# Patient Record
Sex: FEMALE | Race: ASIAN | Employment: FULL TIME | URBAN - METROPOLITAN AREA
[De-identification: names, ages, dates, MRNs, and addresses within clinical notes are randomized per-mention and may not be internally consistent; named-entity substitution may affect disease eponyms.]

---

## 2019-02-27 RX ORDER — LOSARTAN POTASSIUM 25 MG/1
25 TABLET ORAL EVERY MORNING
COMMUNITY
End: 2020-01-02

## 2019-02-28 ENCOUNTER — HOSPITAL ENCOUNTER (INPATIENT)
Facility: CLINIC | Age: 29
LOS: 1 days | Discharge: HOME | DRG: 132 | End: 2019-03-01
Attending: DENTIST | Admitting: DENTIST
Payer: COMMERCIAL

## 2019-02-28 ENCOUNTER — ANESTHESIA (OUTPATIENT)
Dept: SURGERY | Facility: CLINIC | Age: 29
DRG: 132 | End: 2019-02-28
Payer: COMMERCIAL

## 2019-02-28 ENCOUNTER — ANESTHESIA EVENT (OUTPATIENT)
Dept: SURGERY | Facility: CLINIC | Age: 29
DRG: 132 | End: 2019-02-28
Payer: COMMERCIAL

## 2019-02-28 PROBLEM — M26.02 MAXILLARY HYPOPLASIA: Status: ACTIVE | Noted: 2019-02-28

## 2019-02-28 LAB — HCG UR QL: NEGATIVE

## 2019-02-28 PROCEDURE — 25000125 ZZHC RX 250: Performed by: DENTIST

## 2019-02-28 PROCEDURE — C1713 ANCHOR/SCREW BN/BN,TIS/BN: HCPCS | Performed by: DENTIST

## 2019-02-28 PROCEDURE — 25000128 H RX IP 250 OP 636: Performed by: NURSE ANESTHETIST, CERTIFIED REGISTERED

## 2019-02-28 PROCEDURE — 25000128 H RX IP 250 OP 636: Performed by: DENTIST

## 2019-02-28 PROCEDURE — 36000093 ZZH SURGERY LEVEL 4 1ST 30 MIN: Performed by: DENTIST

## 2019-02-28 PROCEDURE — 25000125 ZZHC RX 250: Performed by: NURSE ANESTHETIST, CERTIFIED REGISTERED

## 2019-02-28 PROCEDURE — 40000170 ZZH STATISTIC PRE-PROCEDURE ASSESSMENT II: Performed by: DENTIST

## 2019-02-28 PROCEDURE — 36000063 ZZH SURGERY LEVEL 4 EA 15 ADDTL MIN: Performed by: DENTIST

## 2019-02-28 PROCEDURE — 25000566 ZZH SEVOFLURANE, EA 15 MIN: Performed by: DENTIST

## 2019-02-28 PROCEDURE — 0NST04Z REPOSITION RIGHT MANDIBLE WITH INTERNAL FIXATION DEVICE, OPEN APPROACH: ICD-10-PCS | Performed by: DENTIST

## 2019-02-28 PROCEDURE — 37000008 ZZH ANESTHESIA TECHNICAL FEE, 1ST 30 MIN: Performed by: DENTIST

## 2019-02-28 PROCEDURE — 25000128 H RX IP 250 OP 636: Performed by: ANESTHESIOLOGY

## 2019-02-28 PROCEDURE — 37000009 ZZH ANESTHESIA TECHNICAL FEE, EACH ADDTL 15 MIN: Performed by: DENTIST

## 2019-02-28 PROCEDURE — 81025 URINE PREGNANCY TEST: CPT | Performed by: ANESTHESIOLOGY

## 2019-02-28 PROCEDURE — 27211024 ZZHC OR SUPPLY OTHER OPNP: Performed by: DENTIST

## 2019-02-28 PROCEDURE — 12000000 ZZH R&B MED SURG/OB

## 2019-02-28 PROCEDURE — 25800030 ZZH RX IP 258 OP 636: Performed by: NURSE ANESTHETIST, CERTIFIED REGISTERED

## 2019-02-28 PROCEDURE — 0NSV04Z REPOSITION LEFT MANDIBLE WITH INTERNAL FIXATION DEVICE, OPEN APPROACH: ICD-10-PCS | Performed by: DENTIST

## 2019-02-28 PROCEDURE — 71000012 ZZH RECOVERY PHASE 1 LEVEL 1 FIRST HR: Performed by: DENTIST

## 2019-02-28 PROCEDURE — 0NSR04Z REPOSITION MAXILLA WITH INTERNAL FIXATION DEVICE, OPEN APPROACH: ICD-10-PCS | Performed by: DENTIST

## 2019-02-28 PROCEDURE — 27210794 ZZH OR GENERAL SUPPLY STERILE: Performed by: DENTIST

## 2019-02-28 PROCEDURE — 25000125 ZZHC RX 250: Performed by: ANESTHESIOLOGY

## 2019-02-28 PROCEDURE — 25000132 ZZH RX MED GY IP 250 OP 250 PS 637: Performed by: DENTIST

## 2019-02-28 PROCEDURE — 25800030 ZZH RX IP 258 OP 636: Performed by: DENTIST

## 2019-02-28 PROCEDURE — 0NUR0JZ SUPPLEMENT MAXILLA WITH SYNTHETIC SUBSTITUTE, OPEN APPROACH: ICD-10-PCS | Performed by: DENTIST

## 2019-02-28 DEVICE — WIRE SURGICAL STEEL 26GA 0 DS-26: Type: IMPLANTABLE DEVICE | Site: MAXILLA | Status: FUNCTIONAL

## 2019-02-28 DEVICE — IMP SCR STRK 2.0X04MM 5020404: Type: IMPLANTABLE DEVICE | Site: MANDIBLE | Status: FUNCTIONAL

## 2019-02-28 DEVICE — IMP PLATE STRK ORBITAL 08H 9255758: Type: IMPLANTABLE DEVICE | Site: MAXILLA | Status: FUNCTIONAL

## 2019-02-28 DEVICE — IMP PLATE LEIB SAG 4 HOLE 3MM 92-10565: Type: IMPLANTABLE DEVICE | Site: MANDIBLE | Status: FUNCTIONAL

## 2019-02-28 DEVICE — IMP SCR STRK CROSS 1.7X4MM SELF TAP 5017004: Type: IMPLANTABLE DEVICE | Site: MAXILLA | Status: FUNCTIONAL

## 2019-02-28 DEVICE — WIRE SURGICAL STEEL 24GA 2 DS-24: Type: IMPLANTABLE DEVICE | Site: MANDIBLE | Status: FUNCTIONAL

## 2019-02-28 DEVICE — IMP SCR STRK 2.0X12MM 5020412: Type: IMPLANTABLE DEVICE | Site: MANDIBLE | Status: FUNCTIONAL

## 2019-02-28 DEVICE — IMP SCR STRK 2.0X14MM 5020414: Type: IMPLANTABLE DEVICE | Site: MANDIBLE | Status: FUNCTIONAL

## 2019-02-28 RX ORDER — VECURONIUM BROMIDE 1 MG/ML
INJECTION, POWDER, LYOPHILIZED, FOR SOLUTION INTRAVENOUS PRN
Status: DISCONTINUED | OUTPATIENT
Start: 2019-02-28 | End: 2019-02-28

## 2019-02-28 RX ORDER — AMPICILLIN AND SULBACTAM 2; 1 G/1; G/1
3 INJECTION, POWDER, FOR SOLUTION INTRAMUSCULAR; INTRAVENOUS EVERY 8 HOURS
Status: DISCONTINUED | OUTPATIENT
Start: 2019-02-28 | End: 2019-03-01 | Stop reason: HOSPADM

## 2019-02-28 RX ORDER — VASOPRESSIN 20 U/ML
INJECTION PARENTERAL
Status: DISCONTINUED
Start: 2019-02-28 | End: 2019-02-28 | Stop reason: HOSPADM

## 2019-02-28 RX ORDER — SODIUM CHLORIDE, SODIUM LACTATE, POTASSIUM CHLORIDE, CALCIUM CHLORIDE 600; 310; 30; 20 MG/100ML; MG/100ML; MG/100ML; MG/100ML
INJECTION, SOLUTION INTRAVENOUS CONTINUOUS
Status: DISCONTINUED | OUTPATIENT
Start: 2019-02-28 | End: 2019-03-01 | Stop reason: HOSPADM

## 2019-02-28 RX ORDER — OXYMETAZOLINE HYDROCHLORIDE 0.05 G/100ML
2 SPRAY NASAL ONCE
Status: COMPLETED | OUTPATIENT
Start: 2019-02-28 | End: 2019-02-28

## 2019-02-28 RX ORDER — METHYLPREDNISOLONE SODIUM SUCCINATE 125 MG/2ML
INJECTION, POWDER, LYOPHILIZED, FOR SOLUTION INTRAMUSCULAR; INTRAVENOUS PRN
Status: DISCONTINUED | OUTPATIENT
Start: 2019-02-28 | End: 2019-02-28

## 2019-02-28 RX ORDER — DIPHENHYDRAMINE HCL 25 MG
25 CAPSULE ORAL EVERY 6 HOURS PRN
Status: DISCONTINUED | OUTPATIENT
Start: 2019-02-28 | End: 2019-03-01 | Stop reason: HOSPADM

## 2019-02-28 RX ORDER — ONDANSETRON 4 MG/1
4 TABLET, ORALLY DISINTEGRATING ORAL EVERY 30 MIN PRN
Status: DISCONTINUED | OUTPATIENT
Start: 2019-02-28 | End: 2019-02-28 | Stop reason: HOSPADM

## 2019-02-28 RX ORDER — DIPHENHYDRAMINE HYDROCHLORIDE 50 MG/ML
25 INJECTION INTRAMUSCULAR; INTRAVENOUS EVERY 6 HOURS PRN
Status: DISCONTINUED | OUTPATIENT
Start: 2019-02-28 | End: 2019-03-01 | Stop reason: HOSPADM

## 2019-02-28 RX ORDER — NEOSTIGMINE METHYLSULFATE 1 MG/ML
VIAL (ML) INJECTION PRN
Status: DISCONTINUED | OUTPATIENT
Start: 2019-02-28 | End: 2019-02-28

## 2019-02-28 RX ORDER — NALOXONE HYDROCHLORIDE 0.4 MG/ML
.1-.4 INJECTION, SOLUTION INTRAMUSCULAR; INTRAVENOUS; SUBCUTANEOUS
Status: DISCONTINUED | OUTPATIENT
Start: 2019-02-28 | End: 2019-03-01 | Stop reason: HOSPADM

## 2019-02-28 RX ORDER — CEFAZOLIN SODIUM 1 G/3ML
INJECTION, POWDER, FOR SOLUTION INTRAMUSCULAR; INTRAVENOUS PRN
Status: DISCONTINUED | OUTPATIENT
Start: 2019-02-28 | End: 2019-02-28

## 2019-02-28 RX ORDER — PSEUDOEPHEDRINE HCL 60 MG
60 TABLET ORAL EVERY 6 HOURS PRN
Status: DISCONTINUED | OUTPATIENT
Start: 2019-02-28 | End: 2019-03-01 | Stop reason: HOSPADM

## 2019-02-28 RX ORDER — LABETALOL HYDROCHLORIDE 5 MG/ML
10 INJECTION, SOLUTION INTRAVENOUS
Status: DISCONTINUED | OUTPATIENT
Start: 2019-02-28 | End: 2019-02-28 | Stop reason: HOSPADM

## 2019-02-28 RX ORDER — OXYMETAZOLINE HYDROCHLORIDE 0.05 G/100ML
2 SPRAY NASAL 2 TIMES DAILY PRN
Status: DISCONTINUED | OUTPATIENT
Start: 2019-02-28 | End: 2019-03-01 | Stop reason: HOSPADM

## 2019-02-28 RX ORDER — LIDOCAINE 40 MG/G
CREAM TOPICAL
Status: DISCONTINUED | OUTPATIENT
Start: 2019-02-28 | End: 2019-03-01 | Stop reason: HOSPADM

## 2019-02-28 RX ORDER — HYDROMORPHONE HYDROCHLORIDE 1 MG/ML
.3-.5 INJECTION, SOLUTION INTRAMUSCULAR; INTRAVENOUS; SUBCUTANEOUS EVERY 5 MIN PRN
Status: DISCONTINUED | OUTPATIENT
Start: 2019-02-28 | End: 2019-02-28 | Stop reason: HOSPADM

## 2019-02-28 RX ORDER — NALOXONE HYDROCHLORIDE 0.4 MG/ML
.1-.4 INJECTION, SOLUTION INTRAMUSCULAR; INTRAVENOUS; SUBCUTANEOUS
Status: DISCONTINUED | OUTPATIENT
Start: 2019-02-28 | End: 2019-02-28

## 2019-02-28 RX ORDER — HYDROCODONE BITARTRATE AND ACETAMINOPHEN 5; 325 MG/1; MG/1
1-2 TABLET ORAL EVERY 4 HOURS PRN
Status: DISCONTINUED | OUTPATIENT
Start: 2019-02-28 | End: 2019-03-01 | Stop reason: HOSPADM

## 2019-02-28 RX ORDER — ONDANSETRON 2 MG/ML
4 INJECTION INTRAMUSCULAR; INTRAVENOUS EVERY 6 HOURS PRN
Status: DISCONTINUED | OUTPATIENT
Start: 2019-02-28 | End: 2019-03-01 | Stop reason: HOSPADM

## 2019-02-28 RX ORDER — ONDANSETRON 4 MG/1
4 TABLET, ORALLY DISINTEGRATING ORAL EVERY 6 HOURS PRN
Status: DISCONTINUED | OUTPATIENT
Start: 2019-02-28 | End: 2019-03-01 | Stop reason: HOSPADM

## 2019-02-28 RX ORDER — METHYLPREDNISOLONE SODIUM SUCCINATE 125 MG/2ML
125 INJECTION, POWDER, LYOPHILIZED, FOR SOLUTION INTRAMUSCULAR; INTRAVENOUS EVERY 4 HOURS
Status: COMPLETED | OUTPATIENT
Start: 2019-02-28 | End: 2019-03-01

## 2019-02-28 RX ORDER — CHLORHEXIDINE GLUCONATE ORAL RINSE 1.2 MG/ML
SOLUTION DENTAL PRN
Status: DISCONTINUED | OUTPATIENT
Start: 2019-02-28 | End: 2019-02-28 | Stop reason: HOSPADM

## 2019-02-28 RX ORDER — CHLORHEXIDINE GLUCONATE ORAL RINSE 1.2 MG/ML
10 SOLUTION DENTAL ONCE
Status: COMPLETED | OUTPATIENT
Start: 2019-02-28 | End: 2019-02-28

## 2019-02-28 RX ORDER — MORPHINE SULFATE 2 MG/ML
2-4 INJECTION, SOLUTION INTRAMUSCULAR; INTRAVENOUS
Status: DISCONTINUED | OUTPATIENT
Start: 2019-02-28 | End: 2019-03-01 | Stop reason: HOSPADM

## 2019-02-28 RX ORDER — GLYCOPYRROLATE 0.2 MG/ML
INJECTION, SOLUTION INTRAMUSCULAR; INTRAVENOUS PRN
Status: DISCONTINUED | OUTPATIENT
Start: 2019-02-28 | End: 2019-02-28

## 2019-02-28 RX ORDER — LIDOCAINE HYDROCHLORIDE 20 MG/ML
INJECTION, SOLUTION INFILTRATION; PERINEURAL PRN
Status: DISCONTINUED | OUTPATIENT
Start: 2019-02-28 | End: 2019-02-28

## 2019-02-28 RX ORDER — CHLORHEXIDINE GLUCONATE ORAL RINSE 1.2 MG/ML
15 SOLUTION DENTAL 2 TIMES DAILY
Status: DISCONTINUED | OUTPATIENT
Start: 2019-02-28 | End: 2019-03-01 | Stop reason: HOSPADM

## 2019-02-28 RX ORDER — BENZOCAINE/MENTHOL 6 MG-10 MG
LOZENGE MUCOUS MEMBRANE PRN
Status: DISCONTINUED | OUTPATIENT
Start: 2019-02-28 | End: 2019-02-28 | Stop reason: HOSPADM

## 2019-02-28 RX ORDER — FENTANYL CITRATE 50 UG/ML
25-50 INJECTION, SOLUTION INTRAMUSCULAR; INTRAVENOUS EVERY 5 MIN PRN
Status: DISCONTINUED | OUTPATIENT
Start: 2019-02-28 | End: 2019-02-28 | Stop reason: HOSPADM

## 2019-02-28 RX ORDER — ONDANSETRON 2 MG/ML
4 INJECTION INTRAMUSCULAR; INTRAVENOUS EVERY 30 MIN PRN
Status: DISCONTINUED | OUTPATIENT
Start: 2019-02-28 | End: 2019-02-28 | Stop reason: HOSPADM

## 2019-02-28 RX ORDER — MAGNESIUM HYDROXIDE 1200 MG/15ML
LIQUID ORAL PRN
Status: DISCONTINUED | OUTPATIENT
Start: 2019-02-28 | End: 2019-02-28 | Stop reason: HOSPADM

## 2019-02-28 RX ORDER — CEFAZOLIN SODIUM 1 G/3ML
1 INJECTION, POWDER, FOR SOLUTION INTRAMUSCULAR; INTRAVENOUS SEE ADMIN INSTRUCTIONS
Status: DISCONTINUED | OUTPATIENT
Start: 2019-02-28 | End: 2019-02-28 | Stop reason: HOSPADM

## 2019-02-28 RX ORDER — LIDOCAINE 40 MG/G
CREAM TOPICAL
Status: DISCONTINUED | OUTPATIENT
Start: 2019-02-28 | End: 2019-02-28 | Stop reason: HOSPADM

## 2019-02-28 RX ORDER — SODIUM CHLORIDE, SODIUM LACTATE, POTASSIUM CHLORIDE, CALCIUM CHLORIDE 600; 310; 30; 20 MG/100ML; MG/100ML; MG/100ML; MG/100ML
INJECTION, SOLUTION INTRAVENOUS CONTINUOUS PRN
Status: DISCONTINUED | OUTPATIENT
Start: 2019-02-28 | End: 2019-02-28

## 2019-02-28 RX ORDER — FENTANYL CITRATE 50 UG/ML
INJECTION, SOLUTION INTRAMUSCULAR; INTRAVENOUS PRN
Status: DISCONTINUED | OUTPATIENT
Start: 2019-02-28 | End: 2019-02-28

## 2019-02-28 RX ORDER — METHYLPREDNISOLONE SODIUM SUCCINATE 125 MG/2ML
125 INJECTION, POWDER, LYOPHILIZED, FOR SOLUTION INTRAMUSCULAR; INTRAVENOUS EVERY 6 HOURS
Status: COMPLETED | OUTPATIENT
Start: 2019-03-01 | End: 2019-03-01

## 2019-02-28 RX ORDER — SODIUM CHLORIDE, SODIUM LACTATE, POTASSIUM CHLORIDE, CALCIUM CHLORIDE 600; 310; 30; 20 MG/100ML; MG/100ML; MG/100ML; MG/100ML
INJECTION, SOLUTION INTRAVENOUS CONTINUOUS
Status: DISCONTINUED | OUTPATIENT
Start: 2019-02-28 | End: 2019-02-28 | Stop reason: HOSPADM

## 2019-02-28 RX ORDER — CEFAZOLIN SODIUM 2 G/100ML
2 INJECTION, SOLUTION INTRAVENOUS
Status: COMPLETED | OUTPATIENT
Start: 2019-02-28 | End: 2019-02-28

## 2019-02-28 RX ORDER — PROPOFOL 10 MG/ML
INJECTION, EMULSION INTRAVENOUS PRN
Status: DISCONTINUED | OUTPATIENT
Start: 2019-02-28 | End: 2019-02-28

## 2019-02-28 RX ORDER — ONDANSETRON 2 MG/ML
INJECTION INTRAMUSCULAR; INTRAVENOUS PRN
Status: DISCONTINUED | OUTPATIENT
Start: 2019-02-28 | End: 2019-02-28

## 2019-02-28 RX ADMIN — LIDOCAINE HYDROCHLORIDE 100 MG: 20 INJECTION, SOLUTION INFILTRATION; PERINEURAL at 13:05

## 2019-02-28 RX ADMIN — FENTANYL CITRATE 100 MCG: 50 INJECTION, SOLUTION INTRAMUSCULAR; INTRAVENOUS at 13:39

## 2019-02-28 RX ADMIN — CHLORHEXIDINE GLUCONATE 15 ML: 1.2 RINSE ORAL at 20:34

## 2019-02-28 RX ADMIN — PHENYLEPHRINE HYDROCHLORIDE 100 MCG: 10 INJECTION, SOLUTION INTRAMUSCULAR; INTRAVENOUS; SUBCUTANEOUS at 15:05

## 2019-02-28 RX ADMIN — VECURONIUM BROMIDE 3 MG: 1 INJECTION, POWDER, LYOPHILIZED, FOR SOLUTION INTRAVENOUS at 14:07

## 2019-02-28 RX ADMIN — AMPICILLIN SODIUM AND SULBACTAM SODIUM 3 G: 2; 1 INJECTION, POWDER, FOR SOLUTION INTRAMUSCULAR; INTRAVENOUS at 21:19

## 2019-02-28 RX ADMIN — PROPOFOL 200 MG: 10 INJECTION, EMULSION INTRAVENOUS at 13:05

## 2019-02-28 RX ADMIN — METHYLPREDNISOLONE SODIUM SUCCINATE 125 MG: 125 INJECTION, POWDER, FOR SOLUTION INTRAMUSCULAR; INTRAVENOUS at 22:28

## 2019-02-28 RX ADMIN — SODIUM CHLORIDE, POTASSIUM CHLORIDE, SODIUM LACTATE AND CALCIUM CHLORIDE: 600; 310; 30; 20 INJECTION, SOLUTION INTRAVENOUS at 13:03

## 2019-02-28 RX ADMIN — PHENYLEPHRINE HYDROCHLORIDE 0.2 MCG/KG/MIN: 10 INJECTION, SOLUTION INTRAMUSCULAR; INTRAVENOUS; SUBCUTANEOUS at 15:25

## 2019-02-28 RX ADMIN — PHENYLEPHRINE HYDROCHLORIDE 100 MCG: 10 INJECTION, SOLUTION INTRAMUSCULAR; INTRAVENOUS; SUBCUTANEOUS at 15:28

## 2019-02-28 RX ADMIN — PROCHLORPERAZINE EDISYLATE 10 MG: 5 INJECTION INTRAMUSCULAR; INTRAVENOUS at 22:44

## 2019-02-28 RX ADMIN — NEOSTIGMINE METHYLSULFATE 4 MG: 1 INJECTION, SOLUTION INTRAVENOUS at 16:42

## 2019-02-28 RX ADMIN — GLYCOPYRROLATE 0.6 MG: 0.2 INJECTION, SOLUTION INTRAMUSCULAR; INTRAVENOUS at 16:42

## 2019-02-28 RX ADMIN — FENTANYL CITRATE 100 MCG: 50 INJECTION, SOLUTION INTRAMUSCULAR; INTRAVENOUS at 14:04

## 2019-02-28 RX ADMIN — ONDANSETRON 4 MG: 2 INJECTION INTRAMUSCULAR; INTRAVENOUS at 19:30

## 2019-02-28 RX ADMIN — SODIUM CHLORIDE, POTASSIUM CHLORIDE, SODIUM LACTATE AND CALCIUM CHLORIDE: 600; 310; 30; 20 INJECTION, SOLUTION INTRAVENOUS at 19:32

## 2019-02-28 RX ADMIN — SODIUM CHLORIDE, POTASSIUM CHLORIDE, SODIUM LACTATE AND CALCIUM CHLORIDE: 600; 310; 30; 20 INJECTION, SOLUTION INTRAVENOUS at 16:39

## 2019-02-28 RX ADMIN — PHENYLEPHRINE HYDROCHLORIDE 200 MCG: 10 INJECTION, SOLUTION INTRAMUSCULAR; INTRAVENOUS; SUBCUTANEOUS at 16:45

## 2019-02-28 RX ADMIN — VECURONIUM BROMIDE 2 MG: 1 INJECTION, POWDER, LYOPHILIZED, FOR SOLUTION INTRAVENOUS at 14:50

## 2019-02-28 RX ADMIN — CHLORHEXIDINE GLUCONATE 10 ML: 1.2 RINSE ORAL at 12:16

## 2019-02-28 RX ADMIN — METHYLPREDNISOLONE SODIUM SUCCINATE 125 MG: 125 INJECTION, POWDER, FOR SOLUTION INTRAMUSCULAR; INTRAVENOUS at 19:33

## 2019-02-28 RX ADMIN — PHENYLEPHRINE HYDROCHLORIDE 50 MCG: 10 INJECTION, SOLUTION INTRAMUSCULAR; INTRAVENOUS; SUBCUTANEOUS at 15:11

## 2019-02-28 RX ADMIN — CEFAZOLIN SODIUM 2 G: 2 INJECTION, SOLUTION INTRAVENOUS at 13:17

## 2019-02-28 RX ADMIN — OXYMETAZOLINE HYDROCHLORIDE 2 SPRAY: 5 SPRAY NASAL at 12:16

## 2019-02-28 RX ADMIN — LIDOCAINE HYDROCHLORIDE 0.1 ML: 10 INJECTION, SOLUTION EPIDURAL; INFILTRATION; INTRACAUDAL; PERINEURAL at 12:18

## 2019-02-28 RX ADMIN — MIDAZOLAM 2 MG: 1 INJECTION INTRAMUSCULAR; INTRAVENOUS at 13:03

## 2019-02-28 RX ADMIN — FENTANYL CITRATE 50 MCG: 50 INJECTION, SOLUTION INTRAMUSCULAR; INTRAVENOUS at 13:05

## 2019-02-28 RX ADMIN — ROCURONIUM BROMIDE 50 MG: 10 INJECTION INTRAVENOUS at 13:05

## 2019-02-28 RX ADMIN — VECURONIUM BROMIDE 2 MG: 1 INJECTION, POWDER, LYOPHILIZED, FOR SOLUTION INTRAVENOUS at 15:34

## 2019-02-28 RX ADMIN — PHENYLEPHRINE HYDROCHLORIDE 100 MCG: 10 INJECTION, SOLUTION INTRAMUSCULAR; INTRAVENOUS; SUBCUTANEOUS at 14:49

## 2019-02-28 RX ADMIN — METHYLPREDNISOLONE 125 MG: 125 INJECTION, POWDER, LYOPHILIZED, FOR SOLUTION INTRAMUSCULAR; INTRAVENOUS at 13:25

## 2019-02-28 RX ADMIN — PHENYLEPHRINE HYDROCHLORIDE 50 MCG: 10 INJECTION, SOLUTION INTRAMUSCULAR; INTRAVENOUS; SUBCUTANEOUS at 15:22

## 2019-02-28 RX ADMIN — DEXMEDETOMIDINE HYDROCHLORIDE 0.5 MCG/KG/HR: 100 INJECTION, SOLUTION INTRAVENOUS at 13:05

## 2019-02-28 RX ADMIN — CEFAZOLIN SODIUM 1 G: 2 INJECTION, SOLUTION INTRAVENOUS at 15:10

## 2019-02-28 RX ADMIN — Medication 0.3 MG: at 17:50

## 2019-02-28 RX ADMIN — ONDANSETRON 4 MG: 2 INJECTION INTRAMUSCULAR; INTRAVENOUS at 16:34

## 2019-02-28 SDOH — HEALTH STABILITY: MENTAL HEALTH: HOW OFTEN DO YOU HAVE A DRINK CONTAINING ALCOHOL?: NEVER

## 2019-02-28 ASSESSMENT — MIFFLIN-ST. JEOR: SCORE: 1526.95

## 2019-02-28 ASSESSMENT — ACTIVITIES OF DAILY LIVING (ADL): ADLS_ACUITY_SCORE: 14

## 2019-02-28 NOTE — H&P
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  Diagnoses include anterior open bite, maxillary hypoplasia, mandibular prognathism, mandibular asymmetry.  Our plan is for a maxillary Le Fort I procedure to close open bite and to advance the maxilla as much as the incisors were \"detorqued\" with open bite closure.  We will then do a rotational mandibular setback.  Both procedures with rigid fixation.  Preoperative history and physical reveals no contraindications to our planned procedure.  The surgery has been worked up by both Dr. Carr and Dr. Miki Lincoln.  We have used virtual surgical planning.  We have discussed the case with Dr. Jayden Singleton, orthodontist, regarding the maxillary and mandibular movements and what we recommend in terms of maxillary movement and mandibular movement.      All aspects of the Le Fort I procedure in the mandibular osteotomy have been reviewed in detail.  Splints have been constructed, tried in and found to fit satisfactorily.  Informed consent has been reviewed and signed.  The patient understands we will do our very best; however, no guarantees can be made regarding a good result nor freedom from a complication.  Complications include but are not limited to anesthesia, hospitalization, swelling, discomfort, bleeding, nasal airflow changes, nasal alar base changes, changes in chin point, relapse, numbness which can be permanent, endodontic therapy to maxillary or mandibular teeth, need for intermaxillary fixation, special diet, other details for recovery including rinses, antibiotics, analgesics and return visits at weeks 1, 3 and 6.  Ms. Sanchez is willing to accept these risks in proceeding with surgery.  She has consented for a maxillary LeFort I osteotomy for open bite closure and advancement combined with rotational mandibular setback.         MARIELOS CARR DDS, MD  "            D: 2019   T: 2019   MT: MANUEL      Name:     ODALYS RAY   MRN:      [id number]        Account:      [id number]   :      1990        Admitted:     2019                   Document: [id number]

## 2019-02-28 NOTE — BRIEF OP NOTE
[facility]    Brief Operative Note    Pre-operative diagnosis: ANTERIOR OPEN BITE, POSTERIOR MAXILLARY VERTICAL EXCESS, Maxillary A-P hypoplasia, Mandibular A-P hyperplasia  Post-operative diagnosis Same as pre-op  Procedure: Procedure(s):  COMBINED LE FORT ONE, OSTEOTOMY SAGITTAL SPLIT  Surgeon: Surgeon(s) and Role:     * Prashanth Dumont, MAHNAZ - Primary     * Len Lincoln DDS - Assisting  Anesthesia: General   Estimated blood loss: 200 ml  Drains: None  Specimens: * No specimens in log *  Findings:   None.  Complications: None.  Implants: None.

## 2019-02-28 NOTE — ANESTHESIA CARE TRANSFER NOTE
Patient: Bailee Sanchez    Procedure(s):  COMBINED LE FORT ONE, OSTEOTOMY SAGITTAL SPLIT    Diagnosis: ANTERIOR OPEN BITE, POSTERIOR MAXILLARY VERTICAL EXCESS  Diagnosis Additional Information: No value filed.    Anesthesia Type:   General, ETT     Note:  Airway :Face Mask  Patient transferred to:PACU  Comments: Neuromuscular blockade reversed after TOF 4/4, spontaneous respirations, adequate tidal volumes, followed commands to voice, oropharynx suctioned with soft flexible catheter, extubated atraumatically, extubated with suction, airway patent after extubation.  Oxygen via facemask at 8 liters per minute to PACU. Oxygen tubing connected to wall O2 in PACU, SpO2, NiBP, and EKG monitors and alarms on and functioning, Lynn Hugger warmer connected to patient gown, report on patient's clinical status given to PACU RN, RN questions answered. Handoff Report: Identifed the Patient, Identified the Reponsible Provider, Reviewed the pertinent medical history, Discussed the surgical course, Reviewed Intra-OP anesthesia mangement and issues during anesthesia, Set expectations for post-procedure period and Allowed opportunity for questions and acknowledgement of understanding      Vitals: (Last set prior to Anesthesia Care Transfer)    CRNA VITALS  2/28/2019 1625 - 2/28/2019 1701      2/28/2019             NIBP:  123/78    Pulse:  89    NIBP Mean:  96    SpO2:  98 %    Resp Rate (observed):  5  (Abnormal)     Resp Rate (set):  10                Electronically Signed By: JASS Kamara CRNA  February 28, 2019  5:01 PM

## 2019-02-28 NOTE — OP NOTE
DATE OF SERVICE: 2/28/2019     SURGEON: Len Lincoln DDS     FIRST ASSISTANT: Prashanth Dumont DDS, MD     PREOPERATIVE DIAGNOSES:   1.  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  Vertical maxillary excess posterior  3.  Apertognathia  4.  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  Same as preoperative    PROCEDURES PERFORMED:   1. Le Fort I osteotomy with rigid internal fixation.     2. Bilateral sagittal split ramus osteotomy with rigid internal fixation.      3. Application of occlusal splints.        ANESTHESIA:   1. General anesthesia was administered via nasal endotracheal tube.   2. Adjunctive local anesthesia with 2% lidocaine with 1:100,000 epinephrine was administered via local infiltration, as well as bilateral inferior alveolar nerve blocks.        INDICATIONS FOR THE PROCEDURE:   28 year-old female who was under the orthodontic care of Dr. Miranda. She was referred to our office for evaluation and management of malocclusion and orthognathic surgery. Following clinical and radiographic examination, the patient was noted to have Class III malocclusion and anterior open bite. Following discussion of the treatment options with the patient, elected to proceed with a maxillary Le Fort 1 osteotomy single piece advancement and bilateral sagittal split ramus osteotomy setback. The risks of the procedure including pain, swelling, bleeding, infection, damage to adjacent teeth or structures, temporary or permanent V3 or V2 paresthesia, anesthesia or dysesthesia, cranial nerve VII paresis, need for root canal therapy, need for maxillomandibular fixation, need for hardware removal, potential need for occlusal adjustments, orthodontic or surgical relapse. The patient and her parents verbalized thorough understanding of the risks of the procedure Written consent was signed and placed in patient's chart. Preoperatively, the occlusion was established with dental models and surgical movements were coordinated with  virtual surgical planning ([employer] systems). The planned occlusion was reviewed with Dr. Miranda preoperatively.     DESCRIPTION OF PROCEDURE: The patient was met in the preoperative holding area and the risks were reviewed as noted above. Written consent was signed. The patient met with the anesthesia team, who reviewed the history and physical, and then transferred the patient to operating room #22.  The patient was appropriately tucked and padded. The patient underwent IV induction and placement of a nasal endotracheal tube. An NG tube was also placed. The table was then turned 90 degrees. The endotracheal tube was secured by the Anesthesia Service. The patient was prepped in a customary fashion for oral maxillofacial surgical procedures. A timeout was then performed according to [facility] protocol. A throat pack was placed and local anesthesia was administered.     At the outset of the procedure, conservative enameloplasty was performed on teeth #4,5,27,28,29 as noted on the presurgical models.   Attention was first turned to the maxilla. The maxillary vestibular incision was made with a #15 blade and Bovie cautery to incise the mucosa down to periosteum from first molar to first molar in a circumvestibular fashion approximately 5 mm superior to the mucogingival junction. Dissection was then carried in a subperiosteal fashion with a #4 molt periosteal elevator. The piriform rim was identified both on the right and left side, and the dissection was carried to the zygomatic buttresses bilaterally posterior to the junction of the pterygoid plates. Next, the nasal mucosa was dissected around the piriform rim with a #4 periosteal elevator and a [place] tailed periosteal elevator. Once the complete dissection of the nasal mucosa was completed, a malleable was placed along the piriform rim to protect the nasal mucosa. The planned osteotomy was then marked along the anterior maxilla. There was noted to be extrusion  of the root apices of the second premolars and first molars in the posterior. The roots were within bone, but they were extruded out of the alveolus. The osteotomy was made greater than 5 mm superior to these tooth roots. Next, an osteotome was used along the lateral nasal wall and the posterior sinus wall was scored. A nasal septal osteotome was then used to complete the separation of the nasal septum. Pterygoid osteotomes were used bilaterally to fracture the pterygomaxillary junction. A 24 wire was placed in the anterior nasal spine with a wire passing bur. Next, the maxillary down-fracture was completed with gentle manual pressure. Hypotensive anesthesia was used during the maxillary osteotomy and down-fracture portion of the procedure. The area was suctioned and systematically appropriate mobility of the maxilla was achieved. The descending palatine vessels were identified bilaterally, uncovered, and cauterized. The lateral maxillary, lateral nasal, and septal reductions were then completed with a pituitary instrument and an oval bur under irrigation. The reductions were made to match the preoperative plan of approximately 3-4 mm in the posterior maxilla with maintaining vertical anteriorly. Hemostasis was noted. There were minor tears in the nasal mucosa on the left side that were primarily repaired with 4-0 chromic gut sutures. The surgical site was then copiously irrigated with normal saline. Hemostasis was noted at the maxillary surgical site. Prior to fixation of the maxilla, [place] was placed along the posterior maxillary wall.     The intermediate splint was then wired with the patient in maxillomandibular fixation with a combination of 24 gauge wires posteriorly and 26-gauge wires anteriorly. The patient was then placed in maxillomandibular fixation. The maxillomandibular complex was rotated superiorly and the condyles were seated with gentle pressure. The vertical position of the maxillary central  incisors was evaluated and noted to be appropriately positioned, given the preoperative plan. There was appropriate bone contact along the maxillary Le Fort osteotomy. The buttress regions osteotomies were made with a bur in order to fixate the bilateral buttresses with 24 gauge wire osteosynthesis. Two [place] curvilinear plates were then adapted to the anterior maxilla, with 2 screws in the piriform region and one screw in the maxillary segment bilaterally. The plates were then secured with 4 mm screws. Copious irrigation was then performed and hemostasis was noted. The occlusion was evaluated and noted to be consistent with the intermediate position. The anterior nasal spine wire was removed and the nasal spine was reduced. An alar cinch was completed with a 3-0 Vicryl suture.  Primary closure with continuous 4-0 Vicryl suture.      Attention was then turned to the mandible. Local anesthesia was administered and a bite block was placed. Sweetheart retractor was used to retract the tongue and a [place] tail retractor was used to isolate the ascending ramus along with a [place] retractor. The #15 blade and needle tip cautery were then used to create an incision along the ascending ramus and external oblique ridge. The incision was extended to the mesial of the first molar. A #4 molt periosteal elevator was then used to reflect a full thickness periosteal flap along the lateral aspect of the mandible from anterior to posterior in a systematic fashion. A J stripper was used to dissect the inferior border. The medial flap was then developed with a #4 molt periosteal elevator in a subperiosteal fashion superior to the lingula. A modified channel retractor was then used to retract the soft tissues and the neurovascular bundle medially. The lingula was identified with a blunt nerve hook. An oval bur was then used to again osteotomy along the anterior ascending ramus in order to more clearly visualize the lingula. A  Anjelica bur was then used to make the medial cut superior to the lingula for the medial cut, and then a lateral cut was then made in between the first and second molars with a Anjelica bur. This was made through the inferior border. A vertical cut connecting the two osteotomies was then made with a reciprocating saw. These osteotomies were carried out with irrigation at all times. Attention was then turned to completion of the sagittal split osteotomy with a series of osteotomes. The nerve was within the proximal segment following completion of the osteotomy with a Jay osteotome and Jay . Interferences were then removed along the medial aspect of the proximal segment with an oval bur under irrigation. The osteotomy on the right hand side was then completed in an identical fashion. The anterior aspect of the proximal segment on the right hand side was reduced to prevent interferences with the asymmetric mandibular setback. The nerve was noted to be in the proximal segment on the right hand side following the completion of the osteotomy.     Following completion of the osteotomies, maxillomandibular fixation was applied, with the final splint in place with 24-gauge wires posteriorly and 26-gauge wires anteriorly. The patient's occlusion was noted to be appropriate as in the preoperative plan. A Shana 4-hole plate was then positioned along the proximal segments and the proximal segments were gently seated within the fossa. There was passive seating of the proximal segments bilaterally to the distal segment. The plates were then secured bilaterally with a combination of two 4 mm screws on the proximal and distal segments respectively. Prior to plating and securing the proximal segments, the condyles were appropriately seated in centric relation with gentle posterior superior pressure.   The splint was removed and the patient wired into final occlusion without the splint.  A lag screw was then placed  "(14mm on right, 12mm on left) distal to the second molar bilaterally.    The patient was removed from maxillomandibular fixation and the occlusion was evaluated and noted to be stable, with appropriate midline position, overbite and overjet as in the preoperative set-up. The mandibular surgical sites were then copiously irrigated with normal saline. The right and left mandibular incisions were then closed with a combination of interrupted and running 4-0 Vicryl sutures. The oral cavity was then irrigated and suctioned. The throat pack was then removed to suction. The nasogastric tube that was placed by Anesthesia at the beginning of the case was then to be removed by the Anesthesia Service. The needle and sponge counts were noted to be correct by 2.    The patient was then turned over to the Anesthesia Service for a smooth emergence from anesthesia and extubation in the operating room. The patient was noted to be stable following completion of the procedure. The patient was planned for admission postoperatively for monitoring and pain control.     ESTIMATED BLOOD LOSS: 200 mL.   FLUIDS: Please see Anesthesia Report.   DRAINS: None.   SPECIMENS: None.   COMPLICATIONS: None.     IMPLANTS: 4mm Gold Shana screws x8 in mandible and x6 in maxilla.  Lag screws as above.      FINDINGS: The patient's occlusion fit passively within the intermediate and final splints following fixation of the maxilla and mandible, respectively. Following the procedure, the patient was placed in the medium 3/16\" triangle anterior elastics to guide her occlusion into a stable and repeatable position.     Len Lincoln DDS  [facility]    "

## 2019-02-28 NOTE — ANESTHESIA PREPROCEDURE EVALUATION
Anesthesia Pre-Procedure Evaluation    Patient: Bailee Sanchez   MRN: [id number] : 1990          Preoperative Diagnosis: ANTERIOR OPEN BITE, POSTERIOR MAXILLARY VERTICAL EXCESS    Procedure(s):  COMBINED LE FORT ONE, OSTEOTOMY SAGITTAL SPLIT    Past Medical History:   Diagnosis Date     Gout      Hypertension      No past surgical history on file.    Anesthesia Evaluation     .             ROS/MED HX    ENT/Pulmonary:      (-) sleep apnea   Neurologic:       Cardiovascular:     (+) hypertension----. : . . . :. .       METS/Exercise Tolerance:     Hematologic:         Musculoskeletal:         GI/Hepatic:        (-) GERD   Renal/Genitourinary:         Endo:         Psychiatric:         Infectious Disease:         Malignancy:         Other: Comment: Gout;                         Physical Exam  Normal systems: cardiovascular and pulmonary    Airway   Mallampati: III  TM distance: <3 FB  Neck ROM: full    Dental   (+) upper braces and lower braces    Cardiovascular       Pulmonary             No results found for: WBC, HGB, HCT, PLT, CRP, SED, NA, POTASSIUM, CHLORIDE, CO2, BUN, CR, GLC, YUMIKO, PHOS, MAG, ALBUMIN, PROTTOTAL, ALT, AST, GGT, ALKPHOS, BILITOTAL, BILIDIRECT, LIPASE, AMYLASE, LESTER, PTT, INR, FIBR, TSH, T4, T3, HCG, HCGS, CKTOTAL, CKMB, TROPN    Preop Vitals  BP Readings from Last 3 Encounters:   No data found for BP    Pulse Readings from Last 3 Encounters:   No data found for Pulse      Resp Readings from Last 3 Encounters:   No data found for Resp    SpO2 Readings from Last 3 Encounters:   No data found for SpO2      Temp Readings from Last 1 Encounters:   No data found for Temp    Ht Readings from Last 1 Encounters:   No data found for Ht      Wt Readings from Last 1 Encounters:   No data found for Wt    There is no height or weight on file to calculate BMI.       Anesthesia Plan      History & Physical Review  History and physical reviewed and following examination; no interval change.    ASA Status:  2 .     NPO Status:  > 8 hours    Plan for General and ETT with Intravenous induction. Maintenance will be Balanced.    PONV prophylaxis:  Ondansetron (or other 5HT-3) and Dexamethasone or Solumedrol       Postoperative Care  Postoperative pain management:  IV analgesics.      Consents  Anesthetic plan, risks, benefits and alternatives discussed with:  Patient..                 ASTRID KOHLER MD

## 2019-03-01 VITALS
BODY MASS INDEX: 34.91 KG/M2 | OXYGEN SATURATION: 100 % | HEART RATE: 80 BPM | WEIGHT: 184.9 LBS | SYSTOLIC BLOOD PRESSURE: 129 MMHG | DIASTOLIC BLOOD PRESSURE: 68 MMHG | TEMPERATURE: 98.6 F | RESPIRATION RATE: 18 BRPM | HEIGHT: 61 IN

## 2019-03-01 LAB — GLUCOSE BLDC GLUCOMTR-MCNC: 144 MG/DL (ref 70–99)

## 2019-03-01 PROCEDURE — 94660 CPAP INITIATION&MGMT: CPT

## 2019-03-01 PROCEDURE — 25000128 H RX IP 250 OP 636: Performed by: DENTIST

## 2019-03-01 PROCEDURE — 94640 AIRWAY INHALATION TREATMENT: CPT

## 2019-03-01 PROCEDURE — 25800030 ZZH RX IP 258 OP 636: Performed by: DENTIST

## 2019-03-01 PROCEDURE — 40000275 ZZH STATISTIC RCP TIME EA 10 MIN

## 2019-03-01 PROCEDURE — 00000146 ZZHCL STATISTIC GLUCOSE BY METER IP

## 2019-03-01 PROCEDURE — 40000809 ZZH STATISTIC NO DOCUMENTATION TO SUPPORT CHARGE

## 2019-03-01 RX ADMIN — METHYLPREDNISOLONE SODIUM SUCCINATE 125 MG: 125 INJECTION, POWDER, FOR SOLUTION INTRAMUSCULAR; INTRAVENOUS at 13:13

## 2019-03-01 RX ADMIN — AMPICILLIN SODIUM AND SULBACTAM SODIUM 3 G: 2; 1 INJECTION, POWDER, FOR SOLUTION INTRAMUSCULAR; INTRAVENOUS at 05:57

## 2019-03-01 RX ADMIN — AMPICILLIN SODIUM AND SULBACTAM SODIUM 3 G: 2; 1 INJECTION, POWDER, FOR SOLUTION INTRAMUSCULAR; INTRAVENOUS at 13:17

## 2019-03-01 RX ADMIN — CHLORHEXIDINE GLUCONATE 15 ML: 1.2 RINSE ORAL at 09:52

## 2019-03-01 RX ADMIN — SODIUM CHLORIDE, POTASSIUM CHLORIDE, SODIUM LACTATE AND CALCIUM CHLORIDE: 600; 310; 30; 20 INJECTION, SOLUTION INTRAVENOUS at 13:12

## 2019-03-01 RX ADMIN — METHYLPREDNISOLONE SODIUM SUCCINATE 125 MG: 125 INJECTION, POWDER, FOR SOLUTION INTRAMUSCULAR; INTRAVENOUS at 02:59

## 2019-03-01 RX ADMIN — SODIUM CHLORIDE, POTASSIUM CHLORIDE, SODIUM LACTATE AND CALCIUM CHLORIDE: 600; 310; 30; 20 INJECTION, SOLUTION INTRAVENOUS at 02:44

## 2019-03-01 RX ADMIN — METHYLPREDNISOLONE SODIUM SUCCINATE 125 MG: 125 INJECTION, POWDER, FOR SOLUTION INTRAMUSCULAR; INTRAVENOUS at 06:03

## 2019-03-01 ASSESSMENT — ACTIVITIES OF DAILY LIVING (ADL)
ADLS_ACUITY_SCORE: 15

## 2019-03-01 ASSESSMENT — MIFFLIN-ST. JEOR
SCORE: 1506.08
SCORE: 1537.38

## 2019-03-01 NOTE — PLAN OF CARE
Pt. Arrived to floor around 7:00. A&O, VSS, Lung sounds clear, Bowel sounds hypoactive, adequate urine output, Jaw bra and  humidification tent in place. Swollen jaw.  Ambulates independently. Pt dangled and ambulated room.Tolerating clear liquid diet. Denies pain. Complains of continuous nausea, Zofran and compazine given.

## 2019-03-01 NOTE — PROGRESS NOTES
Rounding Note    HPI/Subjective:  Post-operative day 1 status-post LeFort osteotomy advancement (single-piece) and bilateral sagittal split osteotomy setback.  Patient had postoperative nausea last evening, now controlled with Zofran and Compazine.  She has voided, ambulated and has adequate pain control and oral intake (drinking water) for this point in recovery.      Exam:  Bilateral facial swelling consistent with post-op for a LeFort/BSSO.  No epistaxis, nares are patent.  Bilateral V2/V3 hypoesthesia.  Midlines coincident.  Overbite/overjet consistent with OR (slightly open at left incisors: 0.5mm).  Elastics in place, surgical incisions clean/dry/intact.    Vitals: reviewed, stable, see EMR.    Assessment:  Recovering appropriately status-post LeFort/BSSO osteotomies     Plan:      -   Encourage continued ambulation and oral intake today.  Eventually substitute oral meds for all IV meds as possible later today/tonight.  -   OMS will round in AM, with likely discharge Saturday AM.      Len Lincoln D.D.S.  [facility]

## 2019-03-01 NOTE — DISCHARGE SUMMARY
Discharge Summary      Admission Date: February 20, 2019    Admitting Provider:  Len Lincoln DDS    Discharge Date: March 1, 2019    Discharging Provider:  Len Lincoln DDS    Admission Diagnosis:  Maxillary hypoplasia, mandibular hyperplasia, apertognathia    Discharge Diagnosis:  Same as admission    Discharge Condition:  Good.    Indication for Admission:  Patient was admitted for overnight monitoring of airway and pain control after orthognathic surgery.    Hospital Course:  Patient underwent LeFort osteotomy and bilateral sagittal split mandibular osteotomy and recovered well overnight, meeting hospital criteria for discharge on post-op day 1.    Consults:  Nutrition.     Significant Diagnostic Studies:       Treatments:  LeFort I osteotomy & bilateral sagittal split mandibular osteotomy    Discharge Exam:  Normal exam from baseline other than anticipated surgical changes.    Disposition:  Home or self care.    Patient Instructions:      Activity:  No strenuous exercise or heavy lifting for 1 month.  Diet:  Strict non-chew diet for 6 weeks.  Wound Care:  Warm salt water rinses after meals.  Chlorhexidine mouth rinse twice daily as directed.    Follow-up as arranged already for next week, or patient to call Monday AM to schedule if not.  Appointment phone number: [phone]        Len Lincoln D.D.S.  [facility]

## 2019-03-01 NOTE — PLAN OF CARE
Pt is A/O. VSS. LS clear. Jaw bra and humidification tent in place. Minimal nosebleed. BS hypoactive.  Voiding adequately. Denies pain, N/V. Tolerating clear liquid diet. Nursing will continue to monitor.

## 2019-03-01 NOTE — DISCHARGE INSTRUCTIONS
Discharge Instructions following Jaw Surgery  [facility]    Diet:    Follow instructions per the dietician.  Activities:    No contact sports.    No running.    No weight lifting.    If swimming, no head under water.    Solitary, quiet exercise is okay.    No vigorous exercise or swimming should be allowed because of the difficulty in breathing and the strain on your fixation wires (if wired).  To facilitated breathing, a decongestant stray (ex. Afrin Nasal Spray) should be bought at a pharmacy and carried with you for cases of nasal congestion.  This should be used SPARINGLY.  Bathing/Incision Care    It is important to practice good oral hygiene.  This is VERY important to stop the possibility of rapid decay of the teeth and infection.    Post-operative day 2:  o Brush your teeth 6-8 times a day.  A small, child-sized, soft toothbrush can be used on the outside of teeth to keep the wire and teeth free of debris.  o Don t use Water-Pik until you have checked with your doctor.  o Make sure hooks and power tubes on braces are clean.  o Drink clear liquids after meals and check inside your mouth with your tongue for any debris.  o Do several warm salt-rinses daily (better than mouth rinses that include alcohol in their contents).  o Use smaller amounts of toothpaste.  What to expect:    Swelling following this type of surgery is completely normal.  Generally swelling increases for about 48-72 hours after surgery then begins to decrease gradually.  One half of the swelling will be gone in 7-10 days; however, it is normal for a small amount of swelling to persist for 4-6 weeks.    Bowel habits may change during your fixation period.  Following surgery, it is common to not have a bowel movement for several days.  If this becomes a problem consult your oral surgeon at one of your routine appointments.    Nausea is no cause for alarm.  Remember that even if you should be nauseated  and vomit, everything that is in your stomach has been strained through your teeth.  However, at the first sign of persistent, significant nausea, call your oral surgeon and he/she will prescribe anti-nausea medication for you in a suppository form.    Medications which have been prescribed for you are also important.  If an antibiotic has been prescribed, it is very important to continue this preparation as directed until it has all been taken.  Also, a liquid pain medication can be taken as directly only if needed f or discomfort.  Call your surgeon if you have these signs or symptoms:    First sign of persistent, significant nausea    Fever/chills greater than 101 oF.    Pain not relieved with pain medicine.    With any questions or concerns.    Feel free to call the office should any problems develop.  The doctor who is on-call will be able to assist you.  Should an immediate emergency develop, go to the nearest hospital emergency room.    Take all medications and follow-up as instructed by your surgeon.    If wired, you should carry your wire cutters with you at all times to be used to cut all the vertical wires between your upper and lower teeth in case of emergency.

## 2019-03-01 NOTE — PLAN OF CARE
VSS.  Pt up independently in the room.  Pt doing well with salt water rinses and tolerating clear liquid diet.  Pt's diet advanced to full liquid.  Pt denies nausea.  Pt denies pain.  Voiding in the bathroom adequate urine amounts.

## 2019-03-01 NOTE — CONSULTS
NUTRITION EDUCATION    REASON FOR ASSESSMENT:  Nutrition education on Jaw Surgery Diet    CURRENT DIET:  Clear Liquid Jaw Surgery Diet    NUTRITION HISTORY:  Deferred    NUTRITION DIAGNOSIS:  Food- and nutrition-related knowledge deficit R/t lack of prior exposure to information AEB recent jaw surgery.    INTERVENTIONS:    Nutrition Prescription:  Recommended adequate calories and protein to promote healing, several small meals per day, and use of high protein oral supplements.    Implementation:    Assessed learning needs, learning preferences, and willingness to learn    Nutrition Education  (Content):  a) Provided handout  What to Eat After Jaw Surgery   b) Described diet progression per guidelines listed in handout  c) Discussed importance of small meals    Medical Food Supplements - recommended use of a high protein nutritional supplement    Anticipate good compliance    Diet Education - refer to Education Flowsheet    Goals:    Patient verbalizes understanding of diet     All of the above goals met during the education session    Follow Up:    Provided RD contact information for future questions      Yamilet Mendenhall RD  Pager [phone] (M-F)            [phone] ([facility] Hol)

## 2019-03-01 NOTE — PROGRESS NOTES
Pt wants to go home.  Pt drinking liquids well, no nausea, no vomiting.  Pt is compliant with salt rinses and oral cares. Pt is up ambulating in the hallways and in the room.  Voiding adequate urine amount.  Call placed to the MD, awaiting a call back.

## 2019-03-01 NOTE — ANESTHESIA POSTPROCEDURE EVALUATION
Patient: Bailee Sanchez    Procedure(s):  COMBINED LE FORT ONE, OSTEOTOMY SAGITTAL SPLIT    Diagnosis:ANTERIOR OPEN BITE, POSTERIOR MAXILLARY VERTICAL EXCESS  Diagnosis Additional Information: No value filed.    Anesthesia Type:  General, ETT    Note:  Anesthesia Post Evaluation    Patient location during evaluation: PACU  Patient participation: Able to fully participate in evaluation  Level of consciousness: awake and alert  Pain management: adequate  Airway patency: patent  Cardiovascular status: acceptable  Respiratory status: acceptable and unassisted  Hydration status: acceptable  PONV: none             Last vitals:  Vitals:    02/28/19 1810 02/28/19 1850 02/28/19 1936   BP: 114/73 118/76 114/64   Pulse: 81 75    Resp: 24 20    Temp:  36.7  C (98.1  F)    SpO2: 99% 95% 94%         Electronically Signed By: iMnnie Vaz MD  February 28, 2019  7:40 PM

## 2019-03-02 NOTE — PROGRESS NOTES
Pt d/c to home around 1730 per MD orders.  Friend to drive her home.  Pt stated she understood discharge  instructions including medication instructions.  Pt packed her belonging and dressed herself.

## 2019-04-19 ENCOUNTER — HEALTH MAINTENANCE LETTER (OUTPATIENT)
Age: 29
End: 2019-04-19

## 2020-01-02 ENCOUNTER — PRENATAL OFFICE VISIT (OUTPATIENT)
Dept: OBGYN | Facility: CLINIC | Age: 30
End: 2020-01-02
Payer: COMMERCIAL

## 2020-01-02 ENCOUNTER — ANCILLARY PROCEDURE (OUTPATIENT)
Dept: ULTRASOUND IMAGING | Facility: CLINIC | Age: 30
End: 2020-01-02
Payer: COMMERCIAL

## 2020-01-02 VITALS
WEIGHT: 163.2 LBS | BODY MASS INDEX: 30.81 KG/M2 | DIASTOLIC BLOOD PRESSURE: 87 MMHG | HEIGHT: 61 IN | HEART RATE: 88 BPM | SYSTOLIC BLOOD PRESSURE: 132 MMHG

## 2020-01-02 DIAGNOSIS — O36.80X0 PREGNANCY WITH INCONCLUSIVE FETAL VIABILITY: ICD-10-CM

## 2020-01-02 DIAGNOSIS — O36.80X0 PREGNANCY WITH INCONCLUSIVE FETAL VIABILITY: Primary | ICD-10-CM

## 2020-01-02 DIAGNOSIS — O10.919 CHRONIC HYPERTENSION AFFECTING PREGNANCY: ICD-10-CM

## 2020-01-02 DIAGNOSIS — Z13.79 GENETIC SCREENING: ICD-10-CM

## 2020-01-02 DIAGNOSIS — O09.91 SUPERVISION OF HIGH RISK PREGNANCY IN FIRST TRIMESTER: ICD-10-CM

## 2020-01-02 PROBLEM — O09.90 SUPERVISION OF HIGH-RISK PREGNANCY: Status: ACTIVE | Noted: 2020-01-02

## 2020-01-02 LAB
ABO + RH BLD: NORMAL
ABO + RH BLD: NORMAL
ALBUMIN UR-MCNC: NEGATIVE MG/DL
APPEARANCE UR: CLEAR
BILIRUB UR QL STRIP: NEGATIVE
BLD GP AB SCN SERPL QL: NORMAL
BLOOD BANK CMNT PATIENT-IMP: NORMAL
COLOR UR AUTO: YELLOW
ERYTHROCYTE [DISTWIDTH] IN BLOOD BY AUTOMATED COUNT: 13.5 % (ref 10–15)
GLUCOSE UR STRIP-MCNC: NEGATIVE MG/DL
HCT VFR BLD AUTO: 37.7 % (ref 35–47)
HGB BLD-MCNC: 12.8 G/DL (ref 11.7–15.7)
HGB UR QL STRIP: NEGATIVE
KETONES UR STRIP-MCNC: NEGATIVE MG/DL
LEUKOCYTE ESTERASE UR QL STRIP: NEGATIVE
MCH RBC QN AUTO: 29.4 PG (ref 26.5–33)
MCHC RBC AUTO-ENTMCNC: 34 G/DL (ref 31.5–36.5)
MCV RBC AUTO: 87 FL (ref 78–100)
NITRATE UR QL: NEGATIVE
PH UR STRIP: 5.5 PH (ref 5–7)
PLATELET # BLD AUTO: 361 10E9/L (ref 150–450)
RBC # BLD AUTO: 4.36 10E12/L (ref 3.8–5.2)
SOURCE: NORMAL
SP GR UR STRIP: 1.02 (ref 1–1.03)
SPECIMEN EXP DATE BLD: NORMAL
UROBILINOGEN UR STRIP-ACNC: 0.2 EU/DL (ref 0.2–1)
WBC # BLD AUTO: 14 10E9/L (ref 4–11)

## 2020-01-02 PROCEDURE — 99000 SPECIMEN HANDLING OFFICE-LAB: CPT | Performed by: OBSTETRICS & GYNECOLOGY

## 2020-01-02 PROCEDURE — 87591 N.GONORRHOEAE DNA AMP PROB: CPT | Performed by: OBSTETRICS & GYNECOLOGY

## 2020-01-02 PROCEDURE — 87491 CHLMYD TRACH DNA AMP PROBE: CPT | Performed by: OBSTETRICS & GYNECOLOGY

## 2020-01-02 PROCEDURE — 86762 RUBELLA ANTIBODY: CPT | Performed by: OBSTETRICS & GYNECOLOGY

## 2020-01-02 PROCEDURE — 76805 OB US >/= 14 WKS SNGL FETUS: CPT | Performed by: OBSTETRICS & GYNECOLOGY

## 2020-01-02 PROCEDURE — 87340 HEPATITIS B SURFACE AG IA: CPT | Performed by: OBSTETRICS & GYNECOLOGY

## 2020-01-02 PROCEDURE — 85027 COMPLETE CBC AUTOMATED: CPT | Performed by: OBSTETRICS & GYNECOLOGY

## 2020-01-02 PROCEDURE — 36415 COLL VENOUS BLD VENIPUNCTURE: CPT | Performed by: OBSTETRICS & GYNECOLOGY

## 2020-01-02 PROCEDURE — 86900 BLOOD TYPING SEROLOGIC ABO: CPT | Performed by: OBSTETRICS & GYNECOLOGY

## 2020-01-02 PROCEDURE — 99203 OFFICE O/P NEW LOW 30 MIN: CPT | Mod: 25 | Performed by: OBSTETRICS & GYNECOLOGY

## 2020-01-02 PROCEDURE — 86780 TREPONEMA PALLIDUM: CPT | Performed by: OBSTETRICS & GYNECOLOGY

## 2020-01-02 PROCEDURE — 86850 RBC ANTIBODY SCREEN: CPT | Performed by: OBSTETRICS & GYNECOLOGY

## 2020-01-02 PROCEDURE — 86901 BLOOD TYPING SEROLOGIC RH(D): CPT | Performed by: OBSTETRICS & GYNECOLOGY

## 2020-01-02 PROCEDURE — 87389 HIV-1 AG W/HIV-1&-2 AB AG IA: CPT | Performed by: OBSTETRICS & GYNECOLOGY

## 2020-01-02 PROCEDURE — 81003 URINALYSIS AUTO W/O SCOPE: CPT | Performed by: OBSTETRICS & GYNECOLOGY

## 2020-01-02 PROCEDURE — 87086 URINE CULTURE/COLONY COUNT: CPT | Performed by: OBSTETRICS & GYNECOLOGY

## 2020-01-02 PROCEDURE — 87088 URINE BACTERIA CULTURE: CPT | Performed by: OBSTETRICS & GYNECOLOGY

## 2020-01-02 PROCEDURE — 87186 SC STD MICRODIL/AGAR DIL: CPT | Performed by: OBSTETRICS & GYNECOLOGY

## 2020-01-02 PROCEDURE — 40000791 ZZHCL STATISTIC VERIFI PRENATAL TRISOMY 21,18,13: Mod: 90 | Performed by: OBSTETRICS & GYNECOLOGY

## 2020-01-02 RX ORDER — SERTRALINE HYDROCHLORIDE 25 MG/1
100 TABLET, FILM COATED ORAL DAILY
COMMUNITY
End: 2020-04-01

## 2020-01-02 ASSESSMENT — ANXIETY QUESTIONNAIRES
7. FEELING AFRAID AS IF SOMETHING AWFUL MIGHT HAPPEN: SEVERAL DAYS
2. NOT BEING ABLE TO STOP OR CONTROL WORRYING: SEVERAL DAYS
IF YOU CHECKED OFF ANY PROBLEMS ON THIS QUESTIONNAIRE, HOW DIFFICULT HAVE THESE PROBLEMS MADE IT FOR YOU TO DO YOUR WORK, TAKE CARE OF THINGS AT HOME, OR GET ALONG WITH OTHER PEOPLE: NOT DIFFICULT AT ALL
1. FEELING NERVOUS, ANXIOUS, OR ON EDGE: NOT AT ALL
6. BECOMING EASILY ANNOYED OR IRRITABLE: SEVERAL DAYS
3. WORRYING TOO MUCH ABOUT DIFFERENT THINGS: SEVERAL DAYS
GAD7 TOTAL SCORE: 4
5. BEING SO RESTLESS THAT IT IS HARD TO SIT STILL: NOT AT ALL

## 2020-01-02 ASSESSMENT — MIFFLIN-ST. JEOR: SCORE: 1402.65

## 2020-01-02 ASSESSMENT — PATIENT HEALTH QUESTIONNAIRE - PHQ9
5. POOR APPETITE OR OVEREATING: NOT AT ALL
SUM OF ALL RESPONSES TO PHQ QUESTIONS 1-9: 5

## 2020-01-02 NOTE — PROGRESS NOTES
"  SUBJECTIVE:     HPI:    This is a 29 year old female patient,  who presents for her first obstetrical visit.    GODFREY: 7/3/2020, by Ultrasound.  She is 13w6d weeks.  Her cycles are irregular.  Her last menstrual period was normal.   Since her LMP, she has experienced  cramping).       Additional History:  Has chronic hypertension and was on losartan.  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      Have you travelled during the pregnancy?No  Have your sexual partner(s) travelled during the pregnancy?No      HISTORY:   Planned Pregnancy: Yes  Marital Status: Single  Occupation: HR  Living in Household: Significant Other    Past History:  Her past medical history   Past Medical History:   Diagnosis Date     Depressive disorder      Gout      HSV-1 infection      Hypertension    .      She has a history of  first pregnancy    Since her last LMP she denies use of alcohol, tobacco and street drugs.    Past medical, surgical, social and family history were reviewed and updated in [facility].        Current Outpatient Medications   Medication     Biotin 5000 MCG CAPS     Prenat w/o I-WK-Lvjyzyv-FA-DHA (PNV-DHA PO)     sertraline (ZOLOFT) 25 MG tablet     No current facility-administered medications for this visit.        ROS:   12 point review of systems negative other than symptoms noted below or in the HPI.  Genitourinary: Cramps      OBJECTIVE:     EXAM:  /87   Pulse 88   Ht 1.549 m (5' 1\")   Wt 74 kg (163 lb 3.2 oz)   LMP 2019   BMI 30.84 kg/m   Body mass index is 30.84 kg/m .    GENERAL: healthy, alert and no distress  EYES: Eyes grossly normal to inspection, PERRL and conjunctivae and sclerae normal  HENT: ear canals and TM's normal, nose and mouth without ulcers or lesions  NECK: no adenopathy, no asymmetry, masses, or scars and thyroid normal to palpation  RESP: lungs clear to auscultation - no rales, rhonchi or wheezes  BREAST: normal without masses, tenderness or " nipple discharge and no palpable axillary masses or adenopathy  CV: regular rate and rhythm, normal S1 S2, no S3 or S4, no murmur, click or rub, no peripheral edema and peripheral pulses strong  ABDOMEN: soft, nontender, no hepatosplenomegaly, no masses and bowel sounds normal  MS: no gross musculoskeletal defects noted, no edema  SKIN: no suspicious lesions or rashes  NEURO: Normal strength and tone, mentation intact and speech normal  PSYCH: mentation appears normal, affect normal/bright  CERVIX: no lesions    ASSESSMENT/PLAN:       ICD-10-CM    1. Large for dates P08.1 [facility] REFERRAL-PREGNANCY   2. Supervision of high risk pregnancy in first trimester O09.91        29 year old , 13w6d weeks of pregnancy with GODFREY of 7/3/2020, by Ultrasound    Discussed as follows:    Counseling given:   - Follow up in 4-6 weeks for return OB visit.      PLAN/PATIENT INSTRUCTIONS:    1. CHTN- mfm referral for level 2  2. Will need growth ultrasound  3. ASA 81 mg  4. genetic testing today  5. Discussed flu shot- she is considering    Paula Bagley MD

## 2020-01-03 LAB
C TRACH DNA SPEC QL NAA+PROBE: NEGATIVE
HBV SURFACE AG SERPL QL IA: NONREACTIVE
HIV 1+2 AB+HIV1 P24 AG SERPL QL IA: NONREACTIVE
N GONORRHOEA DNA SPEC QL NAA+PROBE: NEGATIVE
RUBV IGG SERPL IA-ACNC: 13 IU/ML
SPECIMEN SOURCE: NORMAL
SPECIMEN SOURCE: NORMAL
T PALLIDUM AB SER QL: NONREACTIVE

## 2020-01-03 ASSESSMENT — ANXIETY QUESTIONNAIRES: GAD7 TOTAL SCORE: 4

## 2020-01-04 LAB
BACTERIA SPEC CULT: ABNORMAL
Lab: ABNORMAL
SPECIMEN SOURCE: ABNORMAL

## 2020-01-07 ENCOUNTER — TELEPHONE (OUTPATIENT)
Dept: OBGYN | Facility: CLINIC | Age: 30
End: 2020-01-07

## 2020-01-07 ENCOUNTER — MYC MEDICAL ADVICE (OUTPATIENT)
Dept: OBGYN | Facility: CLINIC | Age: 30
End: 2020-01-07

## 2020-01-07 DIAGNOSIS — O23.42 UTI (URINARY TRACT INFECTION) DURING PREGNANCY, SECOND TRIMESTER: Primary | ICD-10-CM

## 2020-01-07 RX ORDER — CEPHALEXIN 500 MG/1
500 CAPSULE ORAL 2 TIMES DAILY
Qty: 14 CAPSULE | Refills: 0 | Status: ON HOLD | OUTPATIENT
Start: 2020-01-07 | End: 2020-02-06

## 2020-01-07 NOTE — TELEPHONE ENCOUNTER
Innatal results: Negative    TEST RESULT INTERPRETATION   Chromosome 21 No aneuploidy detected  Results consistent with two copies of chromosome 21   Chromosome 18 No aneuploidy detected  Results consistent with two copies of chromosome 18   Chromosome 13 No aneuploidy detected  Results consistent with two copies of chromosome 13   Sex Chromosome No aneuploidy detected  Results consistent with two sex chromosomes:  male     Called pt with results. Left detailed vm with negative results. Encouraged pt to call and ask to speak with a triage nurse with questions or interested in knowing the gender of the baby.    Parisa Asencio RN on 1/7/2020 at 2:11 PM

## 2020-01-10 LAB — LAB SCANNED RESULT: NORMAL

## 2020-01-30 ENCOUNTER — PRE VISIT (OUTPATIENT)
Dept: MATERNAL FETAL MEDICINE | Facility: CLINIC | Age: 30
End: 2020-01-30

## 2020-02-06 ENCOUNTER — OFFICE VISIT (OUTPATIENT)
Dept: MATERNAL FETAL MEDICINE | Facility: CLINIC | Age: 30
End: 2020-02-06
Attending: OBSTETRICS & GYNECOLOGY
Payer: COMMERCIAL

## 2020-02-06 ENCOUNTER — HOSPITAL ENCOUNTER (OUTPATIENT)
Facility: CLINIC | Age: 30
Setting detail: OBSERVATION
End: 2020-02-06
Admitting: OBSTETRICS & GYNECOLOGY
Payer: COMMERCIAL

## 2020-02-06 ENCOUNTER — HOSPITAL ENCOUNTER (OUTPATIENT)
Facility: CLINIC | Age: 30
Setting detail: OBSERVATION
Discharge: HOME | End: 2020-02-06
Attending: OBSTETRICS & GYNECOLOGY | Admitting: OBSTETRICS & GYNECOLOGY
Payer: COMMERCIAL

## 2020-02-06 ENCOUNTER — ANESTHESIA EVENT (OUTPATIENT)
Dept: OBGYN | Facility: CLINIC | Age: 30
End: 2020-02-06
Payer: COMMERCIAL

## 2020-02-06 ENCOUNTER — HOSPITAL ENCOUNTER (OUTPATIENT)
Dept: ULTRASOUND IMAGING | Facility: CLINIC | Age: 30
Discharge: HOME | End: 2020-02-06
Attending: OBSTETRICS & GYNECOLOGY | Admitting: OBSTETRICS & GYNECOLOGY
Payer: COMMERCIAL

## 2020-02-06 ENCOUNTER — ANESTHESIA (OUTPATIENT)
Dept: OBGYN | Facility: CLINIC | Age: 30
End: 2020-02-06
Payer: COMMERCIAL

## 2020-02-06 VITALS
WEIGHT: 170 LBS | HEIGHT: 61 IN | BODY MASS INDEX: 32.1 KG/M2 | TEMPERATURE: 98.2 F | RESPIRATION RATE: 16 BRPM | OXYGEN SATURATION: 98 % | HEART RATE: 80 BPM | DIASTOLIC BLOOD PRESSURE: 67 MMHG | SYSTOLIC BLOOD PRESSURE: 119 MMHG

## 2020-02-06 DIAGNOSIS — N88.3 SHORT CERVIX: Primary | ICD-10-CM

## 2020-02-06 DIAGNOSIS — O26.879 SHORT CERVIX AFFECTING PREGNANCY: Primary | ICD-10-CM

## 2020-02-06 DIAGNOSIS — O26.90 PREGNANCY RELATED CONDITION, ANTEPARTUM: ICD-10-CM

## 2020-02-06 LAB
ABO + RH BLD: NORMAL
ABO + RH BLD: NORMAL
ALBUMIN UR-MCNC: NEGATIVE MG/DL
APPEARANCE UR: CLEAR
BILIRUB UR QL STRIP: NEGATIVE
BLD GP AB SCN SERPL QL: NORMAL
BLOOD BANK CMNT PATIENT-IMP: NORMAL
COLOR UR AUTO: YELLOW
CREAT UR-MCNC: 74 MG/DL
ERYTHROCYTE [DISTWIDTH] IN BLOOD BY AUTOMATED COUNT: 14.1 % (ref 10–15)
GLUCOSE BLDC GLUCOMTR-MCNC: 65 MG/DL (ref 70–99)
GLUCOSE BLDC GLUCOMTR-MCNC: 80 MG/DL (ref 70–99)
GLUCOSE UR STRIP-MCNC: NEGATIVE MG/DL
HCT VFR BLD AUTO: 34.5 % (ref 35–47)
HGB BLD-MCNC: 11.5 G/DL (ref 11.7–15.7)
HGB UR QL STRIP: NEGATIVE
KETONES UR STRIP-MCNC: NEGATIVE MG/DL
LEUKOCYTE ESTERASE UR QL STRIP: NEGATIVE
MCH RBC QN AUTO: 30 PG (ref 26.5–33)
MCHC RBC AUTO-ENTMCNC: 33.3 G/DL (ref 31.5–36.5)
MCV RBC AUTO: 90 FL (ref 78–100)
MUCOUS THREADS #/AREA URNS LPF: PRESENT /LPF
NITRATE UR QL: NEGATIVE
PH UR STRIP: 6.5 PH (ref 5–7)
PLATELET # BLD AUTO: 283 10E9/L (ref 150–450)
RBC # BLD AUTO: 3.83 10E12/L (ref 3.8–5.2)
RBC #/AREA URNS AUTO: 1 /HPF (ref 0–2)
SOURCE: ABNORMAL
SP GR UR STRIP: 1.01 (ref 1–1.03)
SPECIMEN EXP DATE BLD: NORMAL
SPECIMEN SOURCE: NORMAL
SQUAMOUS #/AREA URNS AUTO: 3 /HPF (ref 0–1)
UROBILINOGEN UR STRIP-MCNC: NORMAL MG/DL (ref 0–2)
WBC # BLD AUTO: 11.9 10E9/L (ref 4–11)
WBC #/AREA URNS AUTO: 3 /HPF (ref 0–5)
WET PREP SPEC: NORMAL

## 2020-02-06 PROCEDURE — 00000146 ZZHCL STATISTIC GLUCOSE BY METER IP

## 2020-02-06 PROCEDURE — G0378 HOSPITAL OBSERVATION PER HR: HCPCS

## 2020-02-06 PROCEDURE — 87653 STREP B DNA AMP PROBE: CPT | Performed by: STUDENT IN AN ORGANIZED HEALTH CARE EDUCATION/TRAINING PROGRAM

## 2020-02-06 PROCEDURE — 25800030 ZZH RX IP 258 OP 636: Performed by: STUDENT IN AN ORGANIZED HEALTH CARE EDUCATION/TRAINING PROGRAM

## 2020-02-06 PROCEDURE — 25800030 ZZH RX IP 258 OP 636

## 2020-02-06 PROCEDURE — 71000014 ZZH RECOVERY PHASE 1 LEVEL 2 FIRST HR: Performed by: OBSTETRICS & GYNECOLOGY

## 2020-02-06 PROCEDURE — 86850 RBC ANTIBODY SCREEN: CPT | Performed by: STUDENT IN AN ORGANIZED HEALTH CARE EDUCATION/TRAINING PROGRAM

## 2020-02-06 PROCEDURE — 85027 COMPLETE CBC AUTOMATED: CPT | Performed by: STUDENT IN AN ORGANIZED HEALTH CARE EDUCATION/TRAINING PROGRAM

## 2020-02-06 PROCEDURE — 37000009 ZZH ANESTHESIA TECHNICAL FEE, EACH ADDTL 15 MIN: Performed by: OBSTETRICS & GYNECOLOGY

## 2020-02-06 PROCEDURE — 86901 BLOOD TYPING SEROLOGIC RH(D): CPT | Performed by: STUDENT IN AN ORGANIZED HEALTH CARE EDUCATION/TRAINING PROGRAM

## 2020-02-06 PROCEDURE — 25000128 H RX IP 250 OP 636: Performed by: ANESTHESIOLOGY

## 2020-02-06 PROCEDURE — 37000008 ZZH ANESTHESIA TECHNICAL FEE, 1ST 30 MIN: Performed by: OBSTETRICS & GYNECOLOGY

## 2020-02-06 PROCEDURE — 87186 SC STD MICRODIL/AGAR DIL: CPT | Performed by: STUDENT IN AN ORGANIZED HEALTH CARE EDUCATION/TRAINING PROGRAM

## 2020-02-06 PROCEDURE — 27210794 ZZH OR GENERAL SUPPLY STERILE: Performed by: OBSTETRICS & GYNECOLOGY

## 2020-02-06 PROCEDURE — 25000128 H RX IP 250 OP 636: Performed by: STUDENT IN AN ORGANIZED HEALTH CARE EDUCATION/TRAINING PROGRAM

## 2020-02-06 PROCEDURE — 76811 OB US DETAILED SNGL FETUS: CPT

## 2020-02-06 PROCEDURE — 25000132 ZZH RX MED GY IP 250 OP 250 PS 637: Performed by: STUDENT IN AN ORGANIZED HEALTH CARE EDUCATION/TRAINING PROGRAM

## 2020-02-06 PROCEDURE — 36000045 ZZH SURGERY LEVEL 1 1ST 30 MIN - UMMC: Performed by: OBSTETRICS & GYNECOLOGY

## 2020-02-06 PROCEDURE — 25000128 H RX IP 250 OP 636: Performed by: OBSTETRICS & GYNECOLOGY

## 2020-02-06 PROCEDURE — 81001 URINALYSIS AUTO W/SCOPE: CPT | Mod: XU | Performed by: STUDENT IN AN ORGANIZED HEALTH CARE EDUCATION/TRAINING PROGRAM

## 2020-02-06 PROCEDURE — 80349 CANNABINOIDS NATURAL: CPT | Performed by: STUDENT IN AN ORGANIZED HEALTH CARE EDUCATION/TRAINING PROGRAM

## 2020-02-06 PROCEDURE — 96374 THER/PROPH/DIAG INJ IV PUSH: CPT

## 2020-02-06 PROCEDURE — 86900 BLOOD TYPING SEROLOGIC ABO: CPT | Performed by: STUDENT IN AN ORGANIZED HEALTH CARE EDUCATION/TRAINING PROGRAM

## 2020-02-06 PROCEDURE — G0480 DRUG TEST DEF 1-7 CLASSES: HCPCS | Performed by: STUDENT IN AN ORGANIZED HEALTH CARE EDUCATION/TRAINING PROGRAM

## 2020-02-06 PROCEDURE — 87591 N.GONORRHOEAE DNA AMP PROB: CPT | Performed by: STUDENT IN AN ORGANIZED HEALTH CARE EDUCATION/TRAINING PROGRAM

## 2020-02-06 PROCEDURE — 80307 DRUG TEST PRSMV CHEM ANLYZR: CPT | Performed by: STUDENT IN AN ORGANIZED HEALTH CARE EDUCATION/TRAINING PROGRAM

## 2020-02-06 PROCEDURE — 36000047 ZZH SURGERY LEVEL 1 EA 15 ADDTL MIN - UMMC: Performed by: OBSTETRICS & GYNECOLOGY

## 2020-02-06 PROCEDURE — 25000128 H RX IP 250 OP 636

## 2020-02-06 PROCEDURE — 96361 HYDRATE IV INFUSION ADD-ON: CPT | Mod: 59

## 2020-02-06 PROCEDURE — 40000170 ZZH STATISTIC PRE-PROCEDURE ASSESSMENT II: Performed by: OBSTETRICS & GYNECOLOGY

## 2020-02-06 PROCEDURE — 87210 SMEAR WET MOUNT SALINE/INK: CPT | Performed by: STUDENT IN AN ORGANIZED HEALTH CARE EDUCATION/TRAINING PROGRAM

## 2020-02-06 PROCEDURE — 36415 COLL VENOUS BLD VENIPUNCTURE: CPT | Performed by: STUDENT IN AN ORGANIZED HEALTH CARE EDUCATION/TRAINING PROGRAM

## 2020-02-06 PROCEDURE — 76817 TRANSVAGINAL US OBSTETRIC: CPT | Performed by: OBSTETRICS & GYNECOLOGY

## 2020-02-06 PROCEDURE — 87086 URINE CULTURE/COLONY COUNT: CPT | Performed by: STUDENT IN AN ORGANIZED HEALTH CARE EDUCATION/TRAINING PROGRAM

## 2020-02-06 PROCEDURE — 87491 CHLMYD TRACH DNA AMP PROBE: CPT | Performed by: STUDENT IN AN ORGANIZED HEALTH CARE EDUCATION/TRAINING PROGRAM

## 2020-02-06 RX ORDER — CITRIC ACID/SODIUM CITRATE 334-500MG
30 SOLUTION, ORAL ORAL ONCE
Status: COMPLETED | OUTPATIENT
Start: 2020-02-06 | End: 2020-02-06

## 2020-02-06 RX ORDER — CEFAZOLIN SODIUM 2 G/100ML
2 INJECTION, SOLUTION INTRAVENOUS
Status: COMPLETED | OUTPATIENT
Start: 2020-02-06 | End: 2020-02-06

## 2020-02-06 RX ORDER — DEXTROSE, SODIUM CHLORIDE, SODIUM LACTATE, POTASSIUM CHLORIDE, AND CALCIUM CHLORIDE 5; .6; .31; .03; .02 G/100ML; G/100ML; G/100ML; G/100ML; G/100ML
INJECTION, SOLUTION INTRAVENOUS
Status: COMPLETED
Start: 2020-02-06 | End: 2020-02-06

## 2020-02-06 RX ORDER — ONDANSETRON 2 MG/ML
4 INJECTION INTRAMUSCULAR; INTRAVENOUS EVERY 30 MIN PRN
Status: DISCONTINUED | OUTPATIENT
Start: 2020-02-06 | End: 2020-02-06 | Stop reason: HOSPADM

## 2020-02-06 RX ORDER — HYDROXYZINE HYDROCHLORIDE 50 MG/1
50 TABLET, FILM COATED ORAL EVERY 6 HOURS PRN
Status: DISCONTINUED | OUTPATIENT
Start: 2020-02-06 | End: 2020-02-06 | Stop reason: HOSPADM

## 2020-02-06 RX ORDER — LIDOCAINE 40 MG/G
CREAM TOPICAL
Status: DISCONTINUED | OUTPATIENT
Start: 2020-02-06 | End: 2020-02-06

## 2020-02-06 RX ORDER — NALOXONE HYDROCHLORIDE 0.4 MG/ML
.1-.4 INJECTION, SOLUTION INTRAMUSCULAR; INTRAVENOUS; SUBCUTANEOUS
Status: DISCONTINUED | OUTPATIENT
Start: 2020-02-06 | End: 2020-02-06 | Stop reason: HOSPADM

## 2020-02-06 RX ORDER — CEFAZOLIN SODIUM 1 G/3ML
1 INJECTION, POWDER, FOR SOLUTION INTRAMUSCULAR; INTRAVENOUS SEE ADMIN INSTRUCTIONS
Status: DISCONTINUED | OUTPATIENT
Start: 2020-02-06 | End: 2020-02-06

## 2020-02-06 RX ORDER — BUPIVACAINE HYDROCHLORIDE 7.5 MG/ML
INJECTION, SOLUTION INTRASPINAL PRN
Status: DISCONTINUED | OUTPATIENT
Start: 2020-02-06 | End: 2020-02-06

## 2020-02-06 RX ORDER — LABETALOL 20 MG/4 ML (5 MG/ML) INTRAVENOUS SYRINGE
10
Status: DISCONTINUED | OUTPATIENT
Start: 2020-02-06 | End: 2020-02-06 | Stop reason: HOSPADM

## 2020-02-06 RX ORDER — ALUMINA, MAGNESIA, AND SIMETHICONE 2400; 2400; 240 MG/30ML; MG/30ML; MG/30ML
30 SUSPENSION ORAL
Status: DISCONTINUED | OUTPATIENT
Start: 2020-02-06 | End: 2020-02-06 | Stop reason: HOSPADM

## 2020-02-06 RX ORDER — DEXTROSE, SODIUM CHLORIDE, SODIUM LACTATE, POTASSIUM CHLORIDE, AND CALCIUM CHLORIDE 5; .6; .31; .03; .02 G/100ML; G/100ML; G/100ML; G/100ML; G/100ML
INJECTION, SOLUTION INTRAVENOUS CONTINUOUS
Status: DISCONTINUED | OUTPATIENT
Start: 2020-02-06 | End: 2020-02-06 | Stop reason: HOSPADM

## 2020-02-06 RX ORDER — SODIUM CHLORIDE, SODIUM LACTATE, POTASSIUM CHLORIDE, CALCIUM CHLORIDE 600; 310; 30; 20 MG/100ML; MG/100ML; MG/100ML; MG/100ML
INJECTION, SOLUTION INTRAVENOUS CONTINUOUS
Status: DISCONTINUED | OUTPATIENT
Start: 2020-02-06 | End: 2020-02-06

## 2020-02-06 RX ORDER — SODIUM CHLORIDE, SODIUM LACTATE, POTASSIUM CHLORIDE, CALCIUM CHLORIDE 600; 310; 30; 20 MG/100ML; MG/100ML; MG/100ML; MG/100ML
INJECTION, SOLUTION INTRAVENOUS CONTINUOUS
Status: DISCONTINUED | OUTPATIENT
Start: 2020-02-06 | End: 2020-02-06 | Stop reason: HOSPADM

## 2020-02-06 RX ORDER — INDOMETHACIN 50 MG/1
50 SUPPOSITORY RECTAL ONCE
Status: DISCONTINUED | OUTPATIENT
Start: 2020-02-06 | End: 2020-02-06

## 2020-02-06 RX ORDER — ACETAMINOPHEN 325 MG/1
650 TABLET ORAL EVERY 4 HOURS PRN
Status: DISCONTINUED | OUTPATIENT
Start: 2020-02-06 | End: 2020-02-06 | Stop reason: HOSPADM

## 2020-02-06 RX ORDER — ONDANSETRON 2 MG/ML
4 INJECTION INTRAMUSCULAR; INTRAVENOUS EVERY 6 HOURS PRN
Status: DISCONTINUED | OUTPATIENT
Start: 2020-02-06 | End: 2020-02-06 | Stop reason: HOSPADM

## 2020-02-06 RX ORDER — SODIUM CHLORIDE, SODIUM LACTATE, POTASSIUM CHLORIDE, CALCIUM CHLORIDE 600; 310; 30; 20 MG/100ML; MG/100ML; MG/100ML; MG/100ML
INJECTION, SOLUTION INTRAVENOUS
Status: COMPLETED
Start: 2020-02-06 | End: 2020-02-06

## 2020-02-06 RX ORDER — ONDANSETRON 4 MG/1
4 TABLET, ORALLY DISINTEGRATING ORAL EVERY 30 MIN PRN
Status: DISCONTINUED | OUTPATIENT
Start: 2020-02-06 | End: 2020-02-06 | Stop reason: HOSPADM

## 2020-02-06 RX ADMIN — SODIUM CHLORIDE, POTASSIUM CHLORIDE, SODIUM LACTATE AND CALCIUM CHLORIDE 1000 ML: 600; 310; 30; 20 INJECTION, SOLUTION INTRAVENOUS at 17:39

## 2020-02-06 RX ADMIN — BUPIVACAINE HYDROCHLORIDE IN DEXTROSE 1.2 ML: 7.5 INJECTION, SOLUTION SUBARACHNOID at 16:39

## 2020-02-06 RX ADMIN — SODIUM CITRATE AND CITRIC ACID MONOHYDRATE 30 ML: 500; 334 SOLUTION ORAL at 16:17

## 2020-02-06 RX ADMIN — SODIUM CHLORIDE, SODIUM LACTATE, POTASSIUM CHLORIDE, CALCIUM CHLORIDE AND DEXTROSE MONOHYDRATE 1000 ML: 5; 600; 310; 30; 20 INJECTION, SOLUTION INTRAVENOUS at 14:54

## 2020-02-06 RX ADMIN — SODIUM CHLORIDE, POTASSIUM CHLORIDE, SODIUM LACTATE AND CALCIUM CHLORIDE 1000 ML: 600; 310; 30; 20 INJECTION, SOLUTION INTRAVENOUS at 14:29

## 2020-02-06 RX ADMIN — SODIUM CHLORIDE, SODIUM LACTATE, POTASSIUM CHLORIDE, CALCIUM CHLORIDE 1000 ML: 600; 310; 30; 20 INJECTION, SOLUTION INTRAVENOUS at 17:39

## 2020-02-06 RX ADMIN — SODIUM CHLORIDE, SODIUM LACTATE, POTASSIUM CHLORIDE, CALCIUM CHLORIDE AND DEXTROSE MONOHYDRATE: 5; 600; 310; 30; 20 INJECTION, SOLUTION INTRAVENOUS at 16:31

## 2020-02-06 RX ADMIN — CEFAZOLIN SODIUM 2 G: 2 INJECTION, SOLUTION INTRAVENOUS at 16:43

## 2020-02-06 RX ADMIN — ONDANSETRON 4 MG: 2 INJECTION INTRAMUSCULAR; INTRAVENOUS at 17:37

## 2020-02-06 ASSESSMENT — MIFFLIN-ST. JEOR: SCORE: 1433.49

## 2020-02-06 NOTE — ANESTHESIA PREPROCEDURE EVALUATION
Anesthesia Pre-Procedure Evaluation    Patient: Bailee Sanchez   MRN:     [id number] Gender:   female   Age:    29 year old :      1990        Preoperative Diagnosis: 18 weeks gestation of pregnancy [Z3A.18]   Procedure(s):  CERCLAGE, CERVIX, VAGINAL APPROACH     Past Medical History:   Diagnosis Date     Depressive disorder      Gout      HSV-1 infection      Hypertension       Past Surgical History:   Procedure Laterality Date     HEAD & NECK SURGERY      WISDOM TEETH EXTRACTION     LE FORT ONE , OSTEOTOMY SAGITTAL SPLIT, COMBINED N/A 2019    Procedure: COMBINED LE FORT ONE, OSTEOTOMY SAGITTAL SPLIT;  Surgeon: Prashanth Dumont DDS;  Location:  OR          Anesthesia Evaluation     . Pt has had prior anesthetic. Type: General    No history of anesthetic complications          ROS/MED HX    ENT/Pulmonary: Comment: Maxillary hypoplasia      Neurologic:  - neg neurologic ROS     Cardiovascular:     (+) hypertension----. : . . . :. .       METS/Exercise Tolerance:     Hematologic:  - neg hematologic  ROS       Musculoskeletal: Comment: gout        GI/Hepatic:  - neg GI/hepatic ROS       Renal/Genitourinary: Comment: Short cervix        Endo:  - neg endo ROS       Psychiatric:     (+) psychiatric history depression      Infectious Disease: Comment: HSV        Malignancy:         Other:                     JZG FV AN PHYSICAL EXAM    LABS:  CBC:   Lab Results   Component Value Date    WBC 11.9 (H) 2020    WBC 14.0 (H) 2020    HGB 11.5 (L) 2020    HGB 12.8 2020    HCT 34.5 (L) 2020    HCT 37.7 2020     2020     2020     BMP: No results found for: NA, POTASSIUM, CHLORIDE, CO2, BUN, CR, GLC  COAGS: No results found for: PTT, INR, FIBR  POC:   Lab Results   Component Value Date     (H) 2019    HCG Negative 2019     OTHER: No results found for: PH, LACT, A1C, YUMIKO, PHOS, MAG, ALBUMIN, PROTTOTAL, ALT, AST, GGT, ALKPHOS, BILITOTAL, BILIDIRECT,  "LIPASE, AMYLASE, LESTER, TSH, T4, T3, CRP, SED     Preop Vitals    BP Readings from Last 3 Encounters:   02/06/20 119/66   01/02/20 132/87   03/01/19 129/68    Pulse Readings from Last 3 Encounters:   02/06/20 80   01/02/20 88   03/01/19 80      Resp Readings from Last 3 Encounters:   02/06/20 18   03/01/19 18    SpO2 Readings from Last 3 Encounters:   03/01/19 100%      Temp Readings from Last 1 Encounters:   02/06/20 37  C (98.6  F) (Oral)    Ht Readings from Last 1 Encounters:   02/06/20 1.549 m (5' 1\")      Wt Readings from Last 1 Encounters:   02/06/20 77.1 kg (170 lb)    Estimated body mass index is 32.12 kg/m  as calculated from the following:    Height as of this encounter: 1.549 m (5' 1\").    Weight as of this encounter: 77.1 kg (170 lb).     LDA:  Peripheral IV 02/06/20 Left Lower forearm (Active)   Number of days: 0        Assessment:   ASA SCORE: 2    H&P: History and physical reviewed and following examination; no interval change.   Smoking Status:  Non-Smoker/Unknown   NPO Status: NPO Appropriate     Plan:   Anes. Type:  MAC; Spinal   Pre-Medication: None   Induction:  N/a   Airway: Native Airway   Access/Monitoring: PIV   Maintenance: N/a     Postop Plan:   Postop Pain: Regional  Postop Sedation/Airway: Not planned  Disposition: Outpatient     PONV Management:   Adult Risk Factors: Female, Non-Smoker     CONSENT: Direct conversation   Plan and risks discussed with: Patient                      Archana Ortiz MD  "

## 2020-02-06 NOTE — H&P
Antepartum History and Physical   2020  Bailee Sanchez  [id number]      HPI: Bailee Sanchez is a 29 year old  at 18w6d by 7w5d US who presents for cerclage evaluation.     Patient was in clinic this morning for her anatomy scan, and her cervix was noted to be 7mm on ultrasound. Patient was thus referred here for possible cerclage placement after discussion in clinic of trial of vaginal progesterone vs cerclage placement.     She states that she is feeling well today.  She denies headache, vision changes, chest pain, shortness of breath, fever, chills, nausea, vomiting or other systemic complaints. She denies vaginal bleeding or loss of fluid and is starting to feel some fetal movement. No contractions, cramping, or pressure.      Her pregnancy has been complicated by:  - history of chronic hypertension, was previously on losartan, on no medications at this time  - history of HSV1, not on genitals  - gout, no medications currently  - Depression on Zoloft    OBHX:   OB History    Para Term  AB Living   1 0 0 0 0 0   SAB TAB Ectopic Multiple Live Births   0 0 0 0 0      # Outcome Date GA Lbr Blaise/2nd Weight Sex Delivery Anes PTL Lv   1 Current                MedicalHX:   reviewed  Past Medical History:   Diagnosis Date     Depressive disorder      Gout      HSV-1 infection      Hypertension        SurgicalHX:   reviewed  Past Surgical History:   Procedure Laterality Date     HEAD & NECK SURGERY      WISDOM TEETH EXTRACTION     LE FORT ONE , OSTEOTOMY SAGITTAL SPLIT, COMBINED N/A 2019    Procedure: COMBINED LE FORT ONE, OSTEOTOMY SAGITTAL SPLIT;  Surgeon: Prashanth Dumont DDS;  Location:  OR     Medications:   ASA  Biotin  PNV  One-a-day vitamin  zoloft 100mg    Allergies:  No Known Allergies    ROS: 10-point ROS negative except as indicated in HPI.    Physical Exam:  Vitals:    20 1024   BP: 119/66   Pulse: 80   Resp: 18   Temp: 98.6  F (37  C)   TempSrc: Oral   Weight: 77.1 kg (170 lb)  "  Height: 1.549 m (5' 1\")     General: alert, oriented female, resting in bed in NAD  Abdomen: soft, gravid, non-tender  Extremities: bilateral lower extremities non-tender with no edema  : normal external genitalia, normal vagina, minimal white discharge, cervix appears closed    Prenatal ultrasounds:   US: Patient here for detailed anatomy scan. Patient has had aneuploidy risk assessment with low risk NIPT. She is now at 18w6d gestational age.  Active single fetus with normal behavior for gestational age.  Appropriate interval fetal growth by dating from 7 week 5 day ultrasound.  Estimated fetal weight is appropriate for gestational age.  Normal amniotic fluid volume.  Normal fetal anatomy on detailed review. Suboptimal heart and lower extremity views secondary to fetal position.  Mid-trimester formatted genetic scan was completed. Over 20 individual ultrasound markers for aneuploidy were evaluated.  The cervix appears funneled on abdominal examination. Transvaginal exam shows closed CL of 7 mm.    Labs:   Results for orders placed or performed during the hospital encounter of 20 (from the past 24 hour(s))   UA with Microscopic   Result Value Ref Range    Color Urine Yellow     Appearance Urine Clear     Glucose Urine Negative NEG^Negative mg/dL    Bilirubin Urine Negative NEG^Negative    Ketones Urine Negative NEG^Negative mg/dL    Specific Gravity Urine 1.014 1.003 - 1.035    Blood Urine Negative NEG^Negative    pH Urine 6.5 5.0 - 7.0 pH    Protein Albumin Urine Negative NEG^Negative mg/dL    Urobilinogen mg/dL Normal 0.0 - 2.0 mg/dL    Nitrite Urine Negative NEG^Negative    Leukocyte Esterase Urine Negative NEG^Negative    Source Clean catch urine     WBC Urine 3 0 - 5 /HPF    RBC Urine 1 0 - 2 /HPF    Squamous Epithelial /HPF Urine 3 (H) 0 - 1 /HPF    Mucous Urine Present (A) NEG^Negative /LPF   Drug Screen Urine /   Result Value Ref Range    Amphetamine Qual Urine Negative " NEG^Negative    Cannabinoids Qual Urine PENDING NEG^Negative    Cocaine Qual Urine Negative NEG^Negative    Opiates Qualitative Urine Negative NEG^Negative    Pcp Qual Urine PENDING NEG^Negative   ABO/Rh type and screen   Result Value Ref Range    ABO A     RH(D) Pos     Antibody Screen Neg     Test Valid Only At          [facility]    Specimen Expires 2020    CBC with platelets   Result Value Ref Range    WBC 11.9 (H) 4.0 - 11.0 10e9/L    RBC Count 3.83 3.8 - 5.2 10e12/L    Hemoglobin 11.5 (L) 11.7 - 15.7 g/dL    Hematocrit 34.5 (L) 35.0 - 47.0 %    MCV 90 78 - 100 fl    MCH 30.0 26.5 - 33.0 pg    MCHC 33.3 31.5 - 36.5 g/dL    RDW 14.1 10.0 - 15.0 %    Platelet Count 283 150 - 450 10e9/L   Wet prep   Result Value Ref Range    Specimen Description Vagina     Wet Prep No motile Trichomonas seen     Wet Prep No yeast seen     Wet Prep No clue cells seen     Wet Prep Rare  PMNs seen        Labs pending: GBS, GC/CT, UA/UCx, UDS    Assessment: 29 year old  at 18w6d by 7w5d US, here for cerclage placement.    Plan:    Short cervix  - patient is asymptomatic with no obstetric complaints, no concern for  labor with quiet tocometry and closed cervix  - 7mm length today on US, again discussed trial of vaginal progesterone vs cerclage placement and risks/benefits of each. Discussed risk of future cerclage placement with vaginal progesterone and risks of surgery including bleeding, infection, PPROM and pregnancy loss. Patient desires to proceed with cerclage placement. Will proceed this PM.   - NPO since yesterday, 2g Ancef ordered pre-op, anesthesia consult for spinal  - wet prep neg; UA/UCx, GBS, GC/CT, UDS pending    FWB  - doptones before and after procedure    Patient seen and care plan discussed under supervision of Dr. Ramesh.    Miladys Turner, MS4  2020    Resident/Fellow Attestation   I, Jaki Miller, was present with the medical student who participated  in the service and in the documentation of the note.  I have verified the history and personally performed the physical exam and medical decision making.  I agree with the assessment and plan of care as documented in the note.      Jaki Miller MD  PGY3  Date of Service (when I saw the patient): 02/06/20

## 2020-02-06 NOTE — PROVIDER NOTIFICATION
02/06/20 1425   Provider Notification   Provider Name/Title    Method of Notification In Department   Notification Reason Lab/Diagnostic Study   Order was placed to do a blood sugar. Result 65. Pt asymptomatic.  updated on low blood sugar. Will start D5LR at 150ml.hr.

## 2020-02-06 NOTE — OP NOTE
Operative Note: Cervical Cerclage         Pre-Op Diagnosis:   1) Single intrauterine pregnancy at 18w6d by 7 week US  2) Primary cervical insufficiency on US evaluation with cervical length < 10 mm, offered cerclage vs. Vaginal progesterone, accepts cervical cerclage.          Post-Op Diagnosis:   1) Same         Procedure:   1) US indicated cervical cerclage, one McDonal sutures (knot tied at 12 o'clock position)         Surgeons:   Attending: Kaila Ramesh MD  Fellow: N/A  Resident: Diamond Goetz MD  Medical Student: Miladys Turner MS IV         Anesthesia:   Spinal          Estimated Blood Loss:   25 cc         Findings:   1) Cervix 0.5 cm dilated prior to the procedure, closed following the procedure  2) Fetal heart tones confirmed following the procedure         Specimens:   1) None         Complications:   1) None apparent          History:     Bailee Sanchez is a 29 year old y.o.  at 18w6d by first trimester US with US indicated cerclage procedure after asymptomatic cervical shortening < 1 cm on fetal survey today.  After counseling regarding these findings including the option of cerclage vs. Vaginal progesterone based on cervix < 10 mm , the patient has decided opted to proceed with cervical cerclage.         Details of Procedure:     After administration of spinal anesthesia the patient was placed in the dorsal lithotomy position and prepped and draped in the usual fashion.  A weighted speculum was placed into the vagina and right angle retractors were used to visualize the cervix. The cervix was 0.5 cm dilated and about 1 cm in length with no evidence of visible amniotic sac The vagina and cervix were copiously cleansed with betadine solution. The bladder was then straight catheterize for 10 ml clear yellow urine. A circumferential suture of #2 Ethilon was placed in the usual fashion at the cervicovaginal reflection with knot tied at 12 o'clock position.  The suture securely tied down and digital exam  confirmed that the cervix was closed.    .  The cervix and vaginal vault were inspected and noted to be free of injury and hemostatic.   50 mg rectal indocin was placed at the end of procedure.      The instruments were removed from the vagina. She tolerated her spinal anesthesia well without incident. She was subsequently transferred to a labor room for recovery in satisfactory condition.     Sponge and needle counts were correct at the close of the case x 2.      I was present and completed the entire procedure.     Kaila Ramesh MD     Maternal Fetal Medicine  [facility] [phone]  [email or web address]

## 2020-02-06 NOTE — ANESTHESIA PROCEDURE NOTES
Spinal/LP Procedure Note    Spinal Block  Staff:     Anesthesiologist:  Piter Mccann MD  Location: OB  Procedure Start/Stop Times:     patient identified, IV checked, site marked, risks and benefits discussed, informed consent, monitors and equipment checked, pre-op evaluation, at physician/surgeon's request and post-op pain management      Correct Patient: Yes      Correct Position: Yes      Correct Site: Yes      Correct Procedure: Yes      Correct Laterality:  Yes    Site Marked:  Yes  Procedure:     Procedure:  Intrathecal    ASA:  2    Position:  Sitting    Sterile Prep: chloraprep, mask and sterile gloves      Insertion site:  L4-5    Approach:  Midline    Needle Type:  Rose    Needle gauge (G):  25    Local Skin Infiltration:  1% lidocaine    amount (ml):  2    Needle Length (in):  3.5    Introducer used: Yes      Introducer gauge:  20 G    Attempts:  1    Redirects:  0    CSF:  Clear    Paresthesias:  No

## 2020-02-06 NOTE — NURSING NOTE
Patient noted to have shortened cervix during MFM US today.  Decision made to send to [facility] L[facility] for further evaluation for possible cerclage placement.  Dr. Murguia notified on-call MFM provider (Dr. Ramesh) and also primary OB (Dr. Norman) of our findings today and plan of care.  Beckie DOBBINS charge RN at [facility] L[facility] given report and notified that the patient will be coming directly from our MFM Clinic to L&D.  Patient given map and all questions answered at this time.

## 2020-02-06 NOTE — PROVIDER NOTIFICATION
02/06/20 1040   Provider Notification   Provider Name/Title Dr. Miller   Method of Notification At Bedside   Request Evaluate in Person   Notification Reason Patient Arrived   pt here for Cerclage assessment. doptone done, Verdi placed. Labs are collected and sent. Report given to Archana JOHNSON RN

## 2020-02-06 NOTE — PROGRESS NOTES
Please refer to ultrasound report under 'Imaging' Studies of 'Chart Review' tabs.    Osiel Murguia M.D.

## 2020-02-06 NOTE — ANESTHESIA CARE TRANSFER NOTE
Patient: Bailee Sanchez    Procedure(s):  CERCLAGE, CERVIX, VAGINAL APPROACH    Diagnosis: 18 weeks gestation of pregnancy [Z3A.18]  Diagnosis Additional Information: No value filed.    Anesthesia Type:   No value filed.     Note:  Airway :Room Air  Patient transferred to:PACU  Comments: .ebHandoff Report: Identifed the Patient, Identified the Reponsible Provider, Reviewed the pertinent medical history, Discussed the surgical course, Reviewed Intra-OP anesthesia mangement and issues during anesthesia, Set expectations for post-procedure period and Allowed opportunity for questions and acknowledgement of understanding      Vitals: (Last set prior to Anesthesia Care Transfer)    CRNA VITALS  2/6/2020 1642 - 2/6/2020 1724      2/6/2020             Pulse:  65    SpO2:  99 %                Electronically Signed By: JASS Sharif CRNA  February 6, 2020  5:24 PM

## 2020-02-07 DIAGNOSIS — O34.30 CERVICAL CERCLAGE SUTURE PRESENT: Primary | ICD-10-CM

## 2020-02-07 LAB
BACTERIA SPEC CULT: NORMAL
C TRACH DNA SPEC QL NAA+PROBE: NEGATIVE
GP B STREP DNA SPEC QL NAA+PROBE: POSITIVE
Lab: NORMAL
N GONORRHOEA DNA SPEC QL NAA+PROBE: NEGATIVE
SPECIMEN SOURCE: ABNORMAL
SPECIMEN SOURCE: NORMAL

## 2020-02-07 NOTE — PLAN OF CARE
VSS. Denies cramping, no ctx on toco. Pt tolerating eating, no nausea. Ambulated to the bathroom with stand by assist and voided at 2030. Plan is to discharge patient home. Patient agrees with plan of care. Discharge paperwork and teaching completed. Patient discharged at 2100.

## 2020-02-07 NOTE — ANESTHESIA POSTPROCEDURE EVALUATION
Anesthesia POST Procedure Evaluation    Patient: Bailee Sanchez   MRN:     [id number] Gender:   female   Age:    29 year old :      1990        Preoperative Diagnosis: 18 weeks gestation of pregnancy [Z3A.18]   Procedure(s):  CERCLAGE, CERVIX, VAGINAL APPROACH   Postop Comments: No value filed.       Anesthesia Type:  Not documented  No value filed.    Reportable Event: NO     PAIN: Uncomplicated   Sign Out status: Comfortable, Well controlled pain     PONV: No PONV   Sign Out status:  No Nausea or Vomiting     Neuro/Psych: Uneventful perioperative course   Sign Out Status: Preoperative baseline; Age appropriate mentation     Airway/Resp.: Uneventful perioperative course   Sign Out Status: Non labored breathing, age appropriate RR; Resp. Status within EXPECTED Parameters     CV: Uneventful perioperative course   Sign Out status: Appropriate BP and perfusion indices; Appropriate HR/Rhythm     Disposition:   Sign Out in:  PACU  Disposition:  Phase II; Home  Recovery Course: Uneventful  Follow-Up: Not required           Last Anesthesia Record Vitals:  CRNA VITALS  2020 1642 - 2020 1742      2020             Pulse:  65    SpO2:  99 %          Last PACU Vitals:  Vitals Value Taken Time   /57 2020  6:06 PM   Temp     Pulse     Resp     SpO2 96 % 2020  6:17 PM   Temp src     NIBP     Pulse     SpO2     Resp     Temp     Ht Rate     Temp 2     Vitals shown include unvalidated device data.      Electronically Signed By: Piter Mccann MD, 2020, 6:20 PM

## 2020-02-10 LAB
BACTERIA SPEC CULT: ABNORMAL
SPECIMEN SOURCE: ABNORMAL

## 2020-02-12 LAB
AMPHETAMINES UR QL SCN: NEGATIVE
CANNABINOIDS UR CFM-MCNC: 143 NG/ML
CANNABINOIDS UR QL: ABNORMAL
CARBOXYTHC/CREAT UR: 193 NG/MG{CREAT}
COCAINE UR QL: NEGATIVE
OPIATES UR QL SCN: NEGATIVE
PCP UR QL SCN: NEGATIVE

## 2020-02-13 ENCOUNTER — HOSPITAL ENCOUNTER (OUTPATIENT)
Dept: ULTRASOUND IMAGING | Facility: CLINIC | Age: 30
Discharge: HOME | End: 2020-02-13
Attending: OBSTETRICS & GYNECOLOGY | Admitting: OBSTETRICS & GYNECOLOGY
Payer: COMMERCIAL

## 2020-02-13 ENCOUNTER — OFFICE VISIT (OUTPATIENT)
Dept: MATERNAL FETAL MEDICINE | Facility: CLINIC | Age: 30
End: 2020-02-13
Attending: OBSTETRICS & GYNECOLOGY
Payer: COMMERCIAL

## 2020-02-13 DIAGNOSIS — O34.30 CERVICAL CERCLAGE SUTURE PRESENT: ICD-10-CM

## 2020-02-13 DIAGNOSIS — O34.32 CERVICAL CERCLAGE SUTURE PRESENT IN SECOND TRIMESTER: ICD-10-CM

## 2020-02-13 DIAGNOSIS — O26.879 SHORT CERVIX AFFECTING PREGNANCY: Primary | ICD-10-CM

## 2020-02-13 PROCEDURE — 76817 TRANSVAGINAL US OBSTETRIC: CPT

## 2020-02-13 NOTE — PROGRESS NOTES
Please see ultrasound report under imaging tab for details on ultrasound performed today.    Tatiana Burris MD  , OB/GYN  Maternal-Fetal Medicine  [email or web address]  [phone] (Academic office)  [phone] (Pager)

## 2020-02-14 NOTE — DISCHARGE SUMMARY
[facility] Discharge Summary    Bailee Sanchez MRN# [id number]   Age: 29 year old YOB: 1990     Date of Admission:  2020  Date of Discharge::  2020  Admitting Physician:  Kaila Ramesh MD  Discharge Physician:  Kaila Ramesh MD          Admission Diagnoses:   -IUP at 20w0d  - Primary cervical insufficiency with cervical length <10mm          Discharge Diagnosis:     - Same as above s/p Uribe cerclage          Procedures:     Procedure(s): - US indicated cervical cerclage, one McDonal sutures (knot tied at 12 o'clock position)            Medications Prior to Admission:     No medications prior to admission.             Discharge Medications:        Review of your medicines      CONTINUE these medicines which have NOT CHANGED      Dose / Directions   aspirin 81 MG EC tablet  Commonly known as:  ASA  Used for:  Chronic hypertension affecting pregnancy      Dose:  81 mg  Take 1 tablet (81 mg) by mouth daily  Quantity:  90 tablet  Refills:  3     Biotin 5000 MCG Caps      Dose:  5,000 mcg  Take 5,000 mcg by mouth every morning  Refills:  0     PNV-DHA PO      Refills:  0     sertraline 25 MG tablet  Commonly known as:  ZOLOFT      Dose:  50 mg  Take 50 mg by mouth daily  Refills:  0        STOP taking    cephALEXin 500 MG capsule  Commonly known as:  KEFLEX                      Consultations:   Anesthesia         Brief Admission History   Bailee Sanchez is a 29 year old  at 18w6d by 7w5d US who presents for cerclage evaluation.      Patient was in clinic this morning for her anatomy scan, and her cervix was noted to be 7mm on ultrasound. Patient was thus referred here for possible cerclage placement after discussion in clinic of trial of vaginal progesterone vs cerclage placement.      She states that she is feeling well today.  She denies headache, vision changes, chest pain, shortness of breath, fever, chills, nausea, vomiting or other systemic complaints. She denies vaginal bleeding  or loss of fluid and is starting to feel some fetal movement. No contractions, cramping, or pressure.           Hospital Course:   The patient underwent uncomplicated Mora cerclage placement On POD#0 patient had no pain, was eating, ambulating and voiding without difficulty and was discharged to home.          Discharge Instructions and Follow-Up:     Discharge diet: Regular   Discharge activity: Pelvic rest, otherwise normal activity   Discharge follow-up: Follow up in 1 week in [facility]           Discharge Disposition:     Discharged to home in stable condition        Jaki Miller MD  Ob/Gyn PGY-3

## 2020-02-15 ENCOUNTER — NURSE TRIAGE (OUTPATIENT)
Dept: NURSING | Facility: CLINIC | Age: 30
End: 2020-02-15

## 2020-02-15 NOTE — TELEPHONE ENCOUNTER
Patient says she had a cerclage done on .  Last night, she noticed vaginal discharge that was thick and yellow (one teaspoon.  Patient reports she has a small amount of discharge today too.  Patient reports no bleeding, fever or abdominal pain.  Patient says she was seen in the urgency room and told to talk to the on-call doctor.  Patient says urgency room did vaginal swab and tested her for UTI which was negative.  Patient reports she is pregnant at 20 weeks.  Paged on-call provider, Dr. Alonso, at 4:34 pm via page  to call patient back re: patient request call back re: yellow vaginal discharge.      Reason for Disposition    Abnormal color vaginal discharge (i.e., yellow, green, gray)    Additional Information    Negative: [1] Vaginal bleeding AND [2] pregnant > 20 weeks    Negative: [1] Vaginal bleeding AND [2] pregnant < 20 weeks    Negative: [1] Having contractions or other symptoms of labor AND [2] < 37 weeks pregnant (i.e., )    Negative: [1] Having contractions or other symptoms of labor AND [2] > 36 weeks pregnant (i.e., term pregnancy)    Negative: Leakage of fluid (trickle, gush) from the vagina    Negative: Foreign body in vagina (e.g., tampon)    Negative: Pain or burning with urination is main symptom    Negative: [1] Pregnant 23 or more weeks AND [2] baby is moving less today (e.g., kick count < 5 in 1 hour or < 10 in 2 hours)    Negative: Patient sounds very sick or weak to the triager    Negative: [1] Constant abdominal pain AND [2] present > 2 hours    Negative: [1] Intermittent lower abdominal pain AND [2] present > 24 hours    Negative: [1] Pregnant 24-36 weeks () AND [2] pinkish or brownish mucous discharge    Negative: [1] Yellow or green vaginal discharge AND [2] fever    Negative: Painful rash with tiny water blisters    Negative: [1] Rash (e.g., redness, tiny bumps, sore) of genital area AND [2] present > 24 hours    Protocols used: PREGNANCY - VAGINAL  [id number]

## 2020-02-22 ENCOUNTER — HOSPITAL ENCOUNTER (EMERGENCY)
Facility: CLINIC | Age: 30
Discharge: HOME | End: 2020-02-22
Attending: PHYSICIAN ASSISTANT | Admitting: PHYSICIAN ASSISTANT
Payer: COMMERCIAL

## 2020-02-22 VITALS
SYSTOLIC BLOOD PRESSURE: 125 MMHG | RESPIRATION RATE: 16 BRPM | TEMPERATURE: 97.1 F | BODY MASS INDEX: 33.04 KG/M2 | WEIGHT: 175 LBS | OXYGEN SATURATION: 97 % | HEIGHT: 61 IN | DIASTOLIC BLOOD PRESSURE: 80 MMHG

## 2020-02-22 DIAGNOSIS — Z3A.21 21 WEEKS GESTATION OF PREGNANCY: ICD-10-CM

## 2020-02-22 DIAGNOSIS — M10.9 GOUT: ICD-10-CM

## 2020-02-22 PROCEDURE — 99282 EMERGENCY DEPT VISIT SF MDM: CPT

## 2020-02-22 RX ORDER — FOLIC ACID 1 MG/1
1 TABLET ORAL DAILY
COMMUNITY

## 2020-02-22 RX ORDER — PREDNISONE 20 MG/1
TABLET ORAL
Qty: 10 TABLET | Refills: 0 | Status: ON HOLD | OUTPATIENT
Start: 2020-02-22 | End: 2020-04-11

## 2020-02-22 ASSESSMENT — MIFFLIN-ST. JEOR: SCORE: 1456.17

## 2020-02-22 NOTE — ED TRIAGE NOTES
C/o R ankle pain, states usually has gout flare ups in that area and gets a steroid injection.  Feeling same pain today.  Denies fever.  No redness reported to extremity.

## 2020-02-22 NOTE — ED AVS SNAPSHOT
Emergency Department  [address] [place] [id number]  Phone:  [phone]  Fax:  [phone]                                    Bailee Sanchez   MRN: [id number]    Department:   Emergency Department   Date of Visit:  2/22/2020           After Visit Summary Signature Page    I have received my discharge instructions, and my questions have been answered. I have discussed any challenges I see with this plan with the nurse or doctor.    ..........................................................................................................................................  Patient/Patient Representative Signature      ..........................................................................................................................................  Patient Representative Print Name and Relationship to Patient    ..................................................               ................................................  Date                                   Time    ..........................................................................................................................................  Reviewed by Signature/Title    ...................................................              ..............................................  Date                                               Time          22EPIC Rev 08/18

## 2020-02-22 NOTE — ED PROVIDER NOTES
"  History     Chief Complaint:  Ankle Pain       The history is provided by the patient.      Bailee Sanchez is a 29 year old female who is 21 weeks pregnant with a history of gout who presents for evaluation of right ankle pain consistent with her previous episodes of gout. The patient states that this feels like the beginning of a flare up of gout, and that normally she sees her rheumatologist and is administered a prednisone shot. She notes that she feels that these episodes are becoming more frequent, and denies any recent trauma to the area. She states that she has had multiple x-rays in the past for this which were all negative, and again states she believes this is gout. She presents today requesting a prednisone shot, stating that her rheumatologist office is closed today.     Allergies:  No Known Drug Allergies    Medications:    Aspirin  Zoloft  Biotin    Past Medical History:    Hypertension  Gout  HSV-1 infection  Depressive disorder    Past Surgical History:    Cerclage cervical  [place] teeth extraction  Osteotomy sagittal split, LE [place] combined    Family History:    Hypertension  Gout    Social History:  The patient was accompanied to the ED by a significant other.  Smoking Status: Never Smoker  Smokeless Tobacco: Never User  Alcohol Use: No  Drug Use: No  PCP: Diamond Burger     Review of Systems   Musculoskeletal:        (+) Right ankle pain   All other systems reviewed and are negative.    Physical Exam     Patient Vitals for the past 24 hrs:   BP Temp Temp src Heart Rate Resp SpO2 Height Weight   02/22/20 1548 125/80 97.1  F (36.2  C) Oral 91 16 97 % 1.549 m (5' 1\") 79.4 kg (175 lb)       Physical Exam  Constitutional: Alert, attentive, GCS 15   CV: 2+ DP and PT pulses, brisk distal cap refill  Pulm: No respiratory distress  MSK: No tenderness to right ankle. No joint effusion or overlying erythema. Scar where previous prednisone injections administered.   Neurological: Alert, " attentive  5/5 strength with DF and PF motor functions; sensation intact.   Skin: Skin is warm and dry.  Psych: Normal mood and affect    Emergency Department Course     Emergency Department Course:  Past medical records, nursing notes, and vitals reviewed.    1610 I performed an exam of the patient as documented above.     1650 I rechecked the patient who is feeling comfortable with plan for discharge and oral prednisone.     Findings and plan explained to the patient. Patient discharged home with instructions regarding supportive care, medications, and reasons to return. The importance of close follow-up was reviewed. The patient was prescribed prednisone.    I personally answered all related questions prior to discharge.     Impression & Plan     Medical Decision Making:  Bailee Sanchez is a 29 year old, 21-week pregnant, female with medical history including gout presents to the emergency department with right ankle pain consistent to past flareups.  Patient reports she is followed by rheumatology and receives regular prednisone injections to the right ankle for gout.  She developed mild right ankle pain and reports this is consistent with past gout flareups.  There is no sign of septic arthritis, fracture, or sprain. Explained we do not do steroid injections in the emergency department.  She is currently pregnant and cannot take ibuprofen.  Recommended Tylenol for pain.  We discussed the risk and benefit of prednisone and I recommended taking a dose today, tomorrow, and then seeing rheumatology on Monday for an injection.  She agrees with this plan.      Diagnosis:    ICD-10-CM    1. Gout M10.9    2. 21 weeks gestation of pregnancy Z3A.21        Disposition:  Discharged to home.    Discharge Medications:  Discharge Medication List as of 2/22/2020  4:53 PM      START taking these medications    Details   predniSONE (DELTASONE) 20 MG tablet Take two tablets (= 40mg) each day for 5 (five) days, Disp-10 tablet, R-0, Local  Print             Scribe Disclosure:  I, Duane Carrcourtney, am serving as a scribe at 4:10 PM on 2/22/2020 to document services personally performed by Lorena Garcia PA-C based on my observations and the provider's statements to me.      Lorena Garcia PA-C  02/22/20 2150

## 2020-03-05 ENCOUNTER — HOSPITAL ENCOUNTER (OUTPATIENT)
Dept: ULTRASOUND IMAGING | Facility: CLINIC | Age: 30
End: 2020-03-05
Attending: OBSTETRICS & GYNECOLOGY
Payer: COMMERCIAL

## 2020-03-05 ENCOUNTER — OFFICE VISIT (OUTPATIENT)
Dept: MATERNAL FETAL MEDICINE | Facility: CLINIC | Age: 30
End: 2020-03-05
Attending: OBSTETRICS & GYNECOLOGY
Payer: COMMERCIAL

## 2020-03-05 DIAGNOSIS — O26.879 SHORT CERVIX AFFECTING PREGNANCY: ICD-10-CM

## 2020-03-05 DIAGNOSIS — O34.32 CERVICAL CERCLAGE SUTURE PRESENT IN SECOND TRIMESTER: Primary | ICD-10-CM

## 2020-03-05 DIAGNOSIS — O16.2 HYPERTENSION AFFECTING PREGNANCY IN SECOND TRIMESTER: ICD-10-CM

## 2020-03-05 PROCEDURE — 76816 OB US FOLLOW-UP PER FETUS: CPT

## 2020-03-05 NOTE — PROGRESS NOTES
"Please see \"Imaging\" tab under \"Chart Review\" for details of today's US.    Vicki Islas, DO    "

## 2020-03-10 ENCOUNTER — MYC MEDICAL ADVICE (OUTPATIENT)
Dept: OBGYN | Facility: CLINIC | Age: 30
End: 2020-03-10

## 2020-03-11 ENCOUNTER — HEALTH MAINTENANCE LETTER (OUTPATIENT)
Age: 30
End: 2020-03-11

## 2020-03-24 ENCOUNTER — DOCUMENTATION ONLY (OUTPATIENT)
Dept: MATERNAL FETAL MEDICINE | Facility: CLINIC | Age: 30
End: 2020-03-24

## 2020-03-24 NOTE — PROGRESS NOTES
Patient scheduled for US on 3/30/20. US to be rescheduled for GA of 28 weeks based on new COVID protocol. Schedulers will call patient. Routed to primary OB.    Birgid Sotelo RN

## 2020-03-25 ENCOUNTER — TELEPHONE (OUTPATIENT)
Dept: OBGYN | Facility: CLINIC | Age: 30
End: 2020-03-25

## 2020-03-25 DIAGNOSIS — O10.919 CHRONIC HYPERTENSION AFFECTING PREGNANCY: ICD-10-CM

## 2020-03-25 NOTE — TELEPHONE ENCOUNTER
Dr Bagley would like a telephone visit next week with this patient.\  Pt contacted- was then transferred to scheduling.  Gabbi Brennan RN on 3/25/2020 at 12:55 PM

## 2020-03-25 NOTE — TELEPHONE ENCOUNTER
"Requested Prescriptions   Pending Prescriptions Disp Refills     aspirin (ASA) 81 MG EC tablet 90 tablet 3     Sig: Take 1 tablet (81 mg) by mouth daily       Analgesics (Non-Narcotic Tylenol and ASA Only) Failed - 3/25/2020 10:16 AM        Failed - Recent (12 mo) or future (30 days) visit within the authorizing provider's specialty     Patient has had an office visit with the authorizing provider or a provider within the authorizing providers department within the previous 12 mos or has a future within next 30 days. See \"Patient Info\" tab in inbasket, or \"Choose Columns\" in Meds & Orders section of the refill encounter.              Passed - Patient is age 20 years or older     If ASA is flagged for ages under 20 years old. Forward to provider for confirmation Ryes Syndrome is not a concern.              Passed - Medication is active on med list             Last Written Prescription Date:  01/02/20  Last Fill Quantity: 90,  # refills: 3   Last office visit: 1/2/2020 with prescribing provider:  [facility]   Future Office Visit:  04/02/20 MFM  Prescription approved per [facility] Refill Protocol.        "

## 2020-03-25 NOTE — TELEPHONE ENCOUNTER
Requested Prescriptions   Pending Prescriptions Disp Refills     aspirin (ASA) 81 MG EC tablet 90 tablet 3     Sig: Take 1 tablet (81 mg) by mouth daily       There is no refill protocol information for this order      Last Written Prescription Date:  1/2/2020    Last Fill Quantity: 90,  # refills: 3   Last office visit: 1/2/2020 with prescribing provider:  Kevyn Awan   Future Office Visit:

## 2020-03-25 NOTE — TELEPHONE ENCOUNTER
Sending FYI to Dr Bagley regarding refill of RX for ASA 81 mg sent today. Pt has not been seen by you in our clinic since 1/2/20- Has been seen at [facility].   Please advise-  Gabbi Brennan RN on 3/25/2020 at 12:03 PM

## 2020-03-30 NOTE — PROGRESS NOTES
"Bailee Sanchez is a 29 year old female who is being evaluated via a billable telephone visit.      The patient has been notified of following:     \"This telephone visit will be conducted via a call between you and your physician/provider. We have found that certain health care needs can be provided without the need for a physical exam.  This service lets us provide the care you need with a short phone conversation.  If a prescription is necessary we can send it directly to your pharmacy.  If lab work is needed we can place an order for that and you can then stop by our lab to have the test done at a later time.    If during the course of the call the physician/provider feels a telephone visit is not appropriate, you will not be charged for this service.\"     Patient has given verbal consent for Telephone visit?  Yes    Bailee Sanchez complains of    Chief Complaint   Patient presents with     Prenatal Care     Saw PCP 2 weeks ago for consistent numbness in right foot only- they didn't think it was anything too serious. Still has       I have reviewed and updated the patient's Past Medical History, Social History, Family History and Medication List.    ALLERGIES  Patient has no known allergies.      "

## 2020-03-31 ENCOUNTER — MEDICAL CORRESPONDENCE (OUTPATIENT)
Dept: HEALTH INFORMATION MANAGEMENT | Facility: CLINIC | Age: 30
End: 2020-03-31

## 2020-04-01 ENCOUNTER — VIRTUAL VISIT (OUTPATIENT)
Dept: OBGYN | Facility: CLINIC | Age: 30
End: 2020-04-01
Payer: COMMERCIAL

## 2020-04-01 DIAGNOSIS — O09.92 SUPERVISION OF HIGH RISK PREGNANCY IN SECOND TRIMESTER: ICD-10-CM

## 2020-04-01 PROCEDURE — 99207 ZZC PRENATAL VISIT: CPT | Performed by: OBSTETRICS & GYNECOLOGY

## 2020-04-01 RX ORDER — ESCITALOPRAM OXALATE 10 MG/1
TABLET ORAL
COMMUNITY
Start: 2020-03-28

## 2020-04-01 NOTE — PROGRESS NOTES
Patient has follow up appointment with mohini tomorrow.  Discussed bp cuff: she has been checking bps.  Normal range. No medication.  Growth ultrasounds every 4 weeks. Due to the high blood pressure  No vaginal bleeding  Discussed 2 weeks clinic appt.  Glucose testing, cbc, tdap  HSV 1 no genital lesions    She is able to work from home, partner at home

## 2020-04-02 ENCOUNTER — OFFICE VISIT (OUTPATIENT)
Dept: MATERNAL FETAL MEDICINE | Facility: CLINIC | Age: 30
End: 2020-04-02
Attending: OBSTETRICS & GYNECOLOGY
Payer: COMMERCIAL

## 2020-04-02 ENCOUNTER — HOSPITAL ENCOUNTER (OUTPATIENT)
Dept: ULTRASOUND IMAGING | Facility: CLINIC | Age: 30
End: 2020-04-02
Attending: OBSTETRICS & GYNECOLOGY
Payer: COMMERCIAL

## 2020-04-02 DIAGNOSIS — O16.2 HYPERTENSION AFFECTING PREGNANCY IN SECOND TRIMESTER: Primary | ICD-10-CM

## 2020-04-02 DIAGNOSIS — M10.00 IDIOPATHIC GOUT, UNSPECIFIED CHRONICITY, UNSPECIFIED SITE: ICD-10-CM

## 2020-04-02 DIAGNOSIS — O34.32 CERVICAL CERCLAGE SUTURE PRESENT IN SECOND TRIMESTER: ICD-10-CM

## 2020-04-02 DIAGNOSIS — O43.199 MARGINAL INSERTION OF UMBILICAL CORD AFFECTING MANAGEMENT OF MOTHER: ICD-10-CM

## 2020-04-02 DIAGNOSIS — O16.2 HYPERTENSION AFFECTING PREGNANCY IN SECOND TRIMESTER: ICD-10-CM

## 2020-04-02 PROCEDURE — 76816 OB US FOLLOW-UP PER FETUS: CPT

## 2020-04-02 RX ORDER — PREDNISONE 20 MG/1
20 TABLET ORAL DAILY
Qty: 5 TABLET | Refills: 0 | Status: ON HOLD | OUTPATIENT
Start: 2020-04-02 | End: 2020-04-11

## 2020-04-02 NOTE — PROGRESS NOTES
Patient having gout flare. She is usually prescribed a short course of prednisone by mouth. I have reviewed chart and am ordering a course of prednisone as per previous orders.    Please refer to ultrasound report under 'Imaging' Studies of 'Chart Review' tabs.    Osiel Murguia M.D.

## 2020-04-06 ENCOUNTER — HOSPITAL ENCOUNTER (OUTPATIENT)
Facility: CLINIC | Age: 30
Discharge: ANOTHER HOSPITAL | End: 2020-04-06
Attending: OBSTETRICS & GYNECOLOGY | Admitting: OBSTETRICS & GYNECOLOGY
Payer: COMMERCIAL

## 2020-04-06 ENCOUNTER — HOSPITAL ENCOUNTER (INPATIENT)
Facility: CLINIC | Age: 30
LOS: 5 days | Discharge: HOME | End: 2020-04-11
Attending: OBSTETRICS & GYNECOLOGY | Admitting: OBSTETRICS & GYNECOLOGY
Payer: COMMERCIAL

## 2020-04-06 VITALS
RESPIRATION RATE: 18 BRPM | OXYGEN SATURATION: 99 % | DIASTOLIC BLOOD PRESSURE: 74 MMHG | TEMPERATURE: 99 F | SYSTOLIC BLOOD PRESSURE: 115 MMHG

## 2020-04-06 PROBLEM — O42.90 PREMATURE RUPTURE OF MEMBRANES: Status: ACTIVE | Noted: 2020-04-06

## 2020-04-06 PROBLEM — O42.90 AMNIOTIC FLUID LEAKING: Status: ACTIVE | Noted: 2020-04-06

## 2020-04-06 PROBLEM — O26.90 PREGNANCY RELATED CONDITION: Status: ACTIVE | Noted: 2020-04-06

## 2020-04-06 LAB
ABO + RH BLD: NORMAL
ABO + RH BLD: NORMAL
ALBUMIN UR-MCNC: NEGATIVE MG/DL
APPEARANCE UR: CLEAR
BASOPHILS # BLD AUTO: 0 10E9/L (ref 0–0.2)
BASOPHILS NFR BLD AUTO: 0.1 %
BILIRUB UR QL STRIP: NEGATIVE
BLD GP AB SCN SERPL QL: NORMAL
BLOOD BANK CMNT PATIENT-IMP: NORMAL
COLOR UR AUTO: ABNORMAL
DIFFERENTIAL METHOD BLD: ABNORMAL
EOSINOPHIL # BLD AUTO: 0.1 10E9/L (ref 0–0.7)
EOSINOPHIL NFR BLD AUTO: 0.5 %
ERYTHROCYTE [DISTWIDTH] IN BLOOD BY AUTOMATED COUNT: 12.5 % (ref 10–15)
GLUCOSE UR STRIP-MCNC: NEGATIVE MG/DL
HCT VFR BLD AUTO: 33 % (ref 35–47)
HGB BLD-MCNC: 11.2 G/DL (ref 11.7–15.7)
HGB UR QL STRIP: NEGATIVE
IMM GRANULOCYTES # BLD: 0.1 10E9/L (ref 0–0.4)
IMM GRANULOCYTES NFR BLD: 0.4 %
KETONES UR STRIP-MCNC: NEGATIVE MG/DL
LEUKOCYTE ESTERASE UR QL STRIP: NEGATIVE
LYMPHOCYTES # BLD AUTO: 1.5 10E9/L (ref 0.8–5.3)
LYMPHOCYTES NFR BLD AUTO: 11.1 %
MCH RBC QN AUTO: 30.7 PG (ref 26.5–33)
MCHC RBC AUTO-ENTMCNC: 33.9 G/DL (ref 31.5–36.5)
MCV RBC AUTO: 90 FL (ref 78–100)
MONOCYTES # BLD AUTO: 0.4 10E9/L (ref 0–1.3)
MONOCYTES NFR BLD AUTO: 2.8 %
MUCOUS THREADS #/AREA URNS LPF: PRESENT /LPF
NEUTROPHILS # BLD AUTO: 11.1 10E9/L (ref 1.6–8.3)
NEUTROPHILS NFR BLD AUTO: 85.1 %
NITRATE UR QL: NEGATIVE
NRBC # BLD AUTO: 0.1 10*3/UL
NRBC BLD AUTO-RTO: 1 /100
PH UR STRIP: 6 PH (ref 5–7)
PLATELET # BLD AUTO: 268 10E9/L (ref 150–450)
PLATELET # BLD EST: NORMAL 10*3/UL
RBC # BLD AUTO: 3.65 10E12/L (ref 3.8–5.2)
RBC #/AREA URNS AUTO: 1 /HPF (ref 0–2)
RBC MORPH BLD: NORMAL
RUPTURE OF FETAL MEMBRANES BY ROM PLUS: POSITIVE
SOURCE: ABNORMAL
SP GR UR STRIP: 1.02 (ref 1–1.03)
SPECIMEN EXP DATE BLD: NORMAL
SPECIMEN SOURCE: NORMAL
SQUAMOUS #/AREA URNS AUTO: <1 /HPF (ref 0–1)
UROBILINOGEN UR STRIP-MCNC: NORMAL MG/DL (ref 0–2)
WBC # BLD AUTO: 13 10E9/L (ref 4–11)
WBC #/AREA URNS AUTO: 2 /HPF (ref 0–5)
WET PREP SPEC: NORMAL

## 2020-04-06 PROCEDURE — 81001 URINALYSIS AUTO W/SCOPE: CPT | Performed by: STUDENT IN AN ORGANIZED HEALTH CARE EDUCATION/TRAINING PROGRAM

## 2020-04-06 PROCEDURE — 96375 TX/PRO/DX INJ NEW DRUG ADDON: CPT

## 2020-04-06 PROCEDURE — 87186 SC STD MICRODIL/AGAR DIL: CPT | Performed by: STUDENT IN AN ORGANIZED HEALTH CARE EDUCATION/TRAINING PROGRAM

## 2020-04-06 PROCEDURE — 87653 STREP B DNA AMP PROBE: CPT | Performed by: STUDENT IN AN ORGANIZED HEALTH CARE EDUCATION/TRAINING PROGRAM

## 2020-04-06 PROCEDURE — 84112 EVAL AMNIOTIC FLUID PROTEIN: CPT | Performed by: OBSTETRICS & GYNECOLOGY

## 2020-04-06 PROCEDURE — 96360 HYDRATION IV INFUSION INIT: CPT

## 2020-04-06 PROCEDURE — 96372 THER/PROPH/DIAG INJ SC/IM: CPT

## 2020-04-06 PROCEDURE — 86901 BLOOD TYPING SEROLOGIC RH(D): CPT | Performed by: OBSTETRICS & GYNECOLOGY

## 2020-04-06 PROCEDURE — 25000132 ZZH RX MED GY IP 250 OP 250 PS 637: Performed by: OBSTETRICS & GYNECOLOGY

## 2020-04-06 PROCEDURE — 25000128 H RX IP 250 OP 636

## 2020-04-06 PROCEDURE — 36415 COLL VENOUS BLD VENIPUNCTURE: CPT | Performed by: STUDENT IN AN ORGANIZED HEALTH CARE EDUCATION/TRAINING PROGRAM

## 2020-04-06 PROCEDURE — 96365 THER/PROPH/DIAG IV INF INIT: CPT

## 2020-04-06 PROCEDURE — 87591 N.GONORRHOEAE DNA AMP PROB: CPT | Performed by: STUDENT IN AN ORGANIZED HEALTH CARE EDUCATION/TRAINING PROGRAM

## 2020-04-06 PROCEDURE — 12000001 ZZH R&B MED SURG/OB UMMC

## 2020-04-06 PROCEDURE — 96376 TX/PRO/DX INJ SAME DRUG ADON: CPT

## 2020-04-06 PROCEDURE — 86850 RBC ANTIBODY SCREEN: CPT | Performed by: STUDENT IN AN ORGANIZED HEALTH CARE EDUCATION/TRAINING PROGRAM

## 2020-04-06 PROCEDURE — 87210 SMEAR WET MOUNT SALINE/INK: CPT | Performed by: STUDENT IN AN ORGANIZED HEALTH CARE EDUCATION/TRAINING PROGRAM

## 2020-04-06 PROCEDURE — 25800030 ZZH RX IP 258 OP 636: Performed by: OBSTETRICS & GYNECOLOGY

## 2020-04-06 PROCEDURE — 85025 COMPLETE CBC W/AUTO DIFF WBC: CPT | Performed by: STUDENT IN AN ORGANIZED HEALTH CARE EDUCATION/TRAINING PROGRAM

## 2020-04-06 PROCEDURE — 12000000 ZZH R&B MED SURG/OB

## 2020-04-06 PROCEDURE — G0463 HOSPITAL OUTPT CLINIC VISIT: HCPCS

## 2020-04-06 PROCEDURE — 86901 BLOOD TYPING SEROLOGIC RH(D): CPT | Performed by: STUDENT IN AN ORGANIZED HEALTH CARE EDUCATION/TRAINING PROGRAM

## 2020-04-06 PROCEDURE — 87491 CHLMYD TRACH DNA AMP PROBE: CPT | Performed by: STUDENT IN AN ORGANIZED HEALTH CARE EDUCATION/TRAINING PROGRAM

## 2020-04-06 PROCEDURE — 25000128 H RX IP 250 OP 636: Performed by: OBSTETRICS & GYNECOLOGY

## 2020-04-06 PROCEDURE — 96374 THER/PROPH/DIAG INJ IV PUSH: CPT

## 2020-04-06 PROCEDURE — 86900 BLOOD TYPING SEROLOGIC ABO: CPT | Performed by: STUDENT IN AN ORGANIZED HEALTH CARE EDUCATION/TRAINING PROGRAM

## 2020-04-06 PROCEDURE — 96366 THER/PROPH/DIAG IV INF ADDON: CPT

## 2020-04-06 RX ORDER — CALCIUM GLUCONATE 94 MG/ML
1 INJECTION, SOLUTION INTRAVENOUS
Status: DISCONTINUED | OUTPATIENT
Start: 2020-04-06 | End: 2020-04-06 | Stop reason: HOSPADM

## 2020-04-06 RX ORDER — AZITHROMYCIN 250 MG/1
1000 TABLET, FILM COATED ORAL ONCE
Status: COMPLETED | OUTPATIENT
Start: 2020-04-06 | End: 2020-04-06

## 2020-04-06 RX ORDER — CALCIUM GLUCONATE 94 MG/ML
1 INJECTION, SOLUTION INTRAVENOUS
Status: DISCONTINUED | OUTPATIENT
Start: 2020-04-06 | End: 2020-04-06

## 2020-04-06 RX ORDER — ONDANSETRON 2 MG/ML
4 INJECTION INTRAMUSCULAR; INTRAVENOUS EVERY 6 HOURS PRN
Status: DISCONTINUED | OUTPATIENT
Start: 2020-04-06 | End: 2020-04-06 | Stop reason: HOSPADM

## 2020-04-06 RX ORDER — LIDOCAINE 40 MG/G
CREAM TOPICAL
Status: DISCONTINUED | OUTPATIENT
Start: 2020-04-06 | End: 2020-04-06 | Stop reason: HOSPADM

## 2020-04-06 RX ORDER — BETAMETHASONE SODIUM PHOSPHATE AND BETAMETHASONE ACETATE 3; 3 MG/ML; MG/ML
12 INJECTION, SUSPENSION INTRA-ARTICULAR; INTRALESIONAL; INTRAMUSCULAR; SOFT TISSUE EVERY 24 HOURS
Status: DISCONTINUED | OUTPATIENT
Start: 2020-04-06 | End: 2020-04-06 | Stop reason: HOSPADM

## 2020-04-06 RX ORDER — SODIUM CHLORIDE, SODIUM LACTATE, POTASSIUM CHLORIDE, CALCIUM CHLORIDE 600; 310; 30; 20 MG/100ML; MG/100ML; MG/100ML; MG/100ML
INJECTION, SOLUTION INTRAVENOUS CONTINUOUS
Status: DISCONTINUED | OUTPATIENT
Start: 2020-04-06 | End: 2020-04-06 | Stop reason: HOSPADM

## 2020-04-06 RX ORDER — MAGNESIUM SULFATE HEPTAHYDRATE 40 MG/ML
4 INJECTION, SOLUTION INTRAVENOUS ONCE
Status: COMPLETED | OUTPATIENT
Start: 2020-04-06 | End: 2020-04-06

## 2020-04-06 RX ORDER — ONDANSETRON 2 MG/ML
4 INJECTION INTRAMUSCULAR; INTRAVENOUS EVERY 6 HOURS PRN
Status: DISCONTINUED | OUTPATIENT
Start: 2020-04-06 | End: 2020-04-07

## 2020-04-06 RX ORDER — AMOXICILLIN 250 MG/1
250 CAPSULE ORAL 3 TIMES DAILY
Status: DISCONTINUED | OUTPATIENT
Start: 2020-04-08 | End: 2020-04-10

## 2020-04-06 RX ORDER — BETAMETHASONE SODIUM PHOSPHATE AND BETAMETHASONE ACETATE 3; 3 MG/ML; MG/ML
INJECTION, SUSPENSION INTRA-ARTICULAR; INTRALESIONAL; INTRAMUSCULAR; SOFT TISSUE
Status: COMPLETED
Start: 2020-04-06 | End: 2020-04-06

## 2020-04-06 RX ORDER — BETAMETHASONE SODIUM PHOSPHATE AND BETAMETHASONE ACETATE 3; 3 MG/ML; MG/ML
12 INJECTION, SUSPENSION INTRA-ARTICULAR; INTRALESIONAL; INTRAMUSCULAR; SOFT TISSUE EVERY 24 HOURS
Status: COMPLETED | OUTPATIENT
Start: 2020-04-07 | End: 2020-04-07

## 2020-04-06 RX ORDER — AMPICILLIN 2 G/1
2 INJECTION, POWDER, FOR SOLUTION INTRAVENOUS EVERY 6 HOURS
Status: DISPENSED | OUTPATIENT
Start: 2020-04-06 | End: 2020-04-08

## 2020-04-06 RX ORDER — MAGNESIUM SULFATE IN WATER 40 MG/ML
2 INJECTION, SOLUTION INTRAVENOUS CONTINUOUS
Status: DISCONTINUED | OUTPATIENT
Start: 2020-04-06 | End: 2020-04-07

## 2020-04-06 RX ORDER — AMPICILLIN 2 G/1
2 INJECTION, POWDER, FOR SOLUTION INTRAVENOUS EVERY 6 HOURS
Status: DISCONTINUED | OUTPATIENT
Start: 2020-04-06 | End: 2020-04-06 | Stop reason: HOSPADM

## 2020-04-06 RX ORDER — BETAMETHASONE SODIUM PHOSPHATE AND BETAMETHASONE ACETATE 3; 3 MG/ML; MG/ML
12 INJECTION, SUSPENSION INTRA-ARTICULAR; INTRALESIONAL; INTRAMUSCULAR; SOFT TISSUE EVERY 24 HOURS
Status: DISCONTINUED | OUTPATIENT
Start: 2020-04-06 | End: 2020-04-06

## 2020-04-06 RX ORDER — AMOXICILLIN 250 MG/1
250 CAPSULE ORAL 3 TIMES DAILY
Status: DISCONTINUED | OUTPATIENT
Start: 2020-04-08 | End: 2020-04-06 | Stop reason: HOSPADM

## 2020-04-06 RX ORDER — SODIUM CHLORIDE, CALCIUM CHLORIDE, AND POTASSIUM CHLORIDE .86; .033; .03 G/100ML; G/100ML; G/100ML
INJECTION, SOLUTION INTRAVENOUS CONTINUOUS
Status: DISCONTINUED | OUTPATIENT
Start: 2020-04-06 | End: 2020-04-07

## 2020-04-06 RX ORDER — MAGNESIUM SULFATE IN WATER 40 MG/ML
2 INJECTION, SOLUTION INTRAVENOUS CONTINUOUS
Status: DISCONTINUED | OUTPATIENT
Start: 2020-04-06 | End: 2020-04-06 | Stop reason: HOSPADM

## 2020-04-06 RX ORDER — ESCITALOPRAM OXALATE 10 MG/1
10 TABLET ORAL DAILY
Status: DISCONTINUED | OUTPATIENT
Start: 2020-04-07 | End: 2020-04-09

## 2020-04-06 RX ORDER — MAGNESIUM SULFATE HEPTAHYDRATE 40 MG/ML
INJECTION, SOLUTION INTRAVENOUS
Status: COMPLETED
Start: 2020-04-06 | End: 2020-04-06

## 2020-04-06 RX ORDER — CALCIUM GLUCONATE 94 MG/ML
1 INJECTION, SOLUTION INTRAVENOUS
Status: DISCONTINUED | OUTPATIENT
Start: 2020-04-06 | End: 2020-04-07

## 2020-04-06 RX ORDER — MAGNESIUM SULFATE IN WATER 40 MG/ML
2 INJECTION, SOLUTION INTRAVENOUS CONTINUOUS
Status: DISCONTINUED | OUTPATIENT
Start: 2020-04-06 | End: 2020-04-06

## 2020-04-06 RX ADMIN — SODIUM CHLORIDE, POTASSIUM CHLORIDE, SODIUM LACTATE AND CALCIUM CHLORIDE: 600; 310; 30; 20 INJECTION, SOLUTION INTRAVENOUS at 20:20

## 2020-04-06 RX ADMIN — MAGNESIUM SULFATE HEPTAHYDRATE 4 G: 40 INJECTION, SOLUTION INTRAVENOUS at 20:31

## 2020-04-06 RX ADMIN — AZITHROMYCIN MONOHYDRATE 1000 MG: 250 TABLET ORAL at 20:59

## 2020-04-06 RX ADMIN — MAGNESIUM SULFATE IN WATER 2 G/HR: 40 INJECTION, SOLUTION INTRAVENOUS at 20:57

## 2020-04-06 RX ADMIN — BETAMETHASONE SODIUM PHOSPHATE AND BETAMETHASONE ACETATE 12 MG: 3; 3 INJECTION, SUSPENSION INTRA-ARTICULAR; INTRALESIONAL; INTRAMUSCULAR; SOFT TISSUE at 20:38

## 2020-04-06 RX ADMIN — AMPICILLIN SODIUM 2 G: 2 INJECTION, POWDER, FOR SOLUTION INTRAMUSCULAR; INTRAVENOUS at 20:58

## 2020-04-06 RX ADMIN — BETAMETHASONE ACETATE AND BETAMETHASONE SODIUM PHOSPHATE 12 MG: 3; 3 INJECTION, SUSPENSION INTRA-ARTICULAR; INTRALESIONAL; INTRAMUSCULAR; SOFT TISSUE at 20:38

## 2020-04-07 ENCOUNTER — HOSPITAL ENCOUNTER (INPATIENT)
Dept: ULTRASOUND IMAGING | Facility: CLINIC | Age: 30
End: 2020-04-07
Payer: COMMERCIAL

## 2020-04-07 LAB
C TRACH DNA SPEC QL NAA+PROBE: NEGATIVE
GP B STREP DNA SPEC QL NAA+PROBE: POSITIVE
N GONORRHOEA DNA SPEC QL NAA+PROBE: NEGATIVE
SPECIMEN SOURCE: ABNORMAL
SPECIMEN SOURCE: NORMAL
SPECIMEN SOURCE: NORMAL

## 2020-04-07 PROCEDURE — 76819 FETAL BIOPHYS PROFIL W/O NST: CPT

## 2020-04-07 PROCEDURE — 12000001 ZZH R&B MED SURG/OB UMMC

## 2020-04-07 PROCEDURE — 99233 SBSQ HOSP IP/OBS HIGH 50: CPT | Performed by: PHYSICIAN ASSISTANT

## 2020-04-07 PROCEDURE — 25000132 ZZH RX MED GY IP 250 OP 250 PS 637: Performed by: STUDENT IN AN ORGANIZED HEALTH CARE EDUCATION/TRAINING PROGRAM

## 2020-04-07 PROCEDURE — 25000128 H RX IP 250 OP 636: Performed by: STUDENT IN AN ORGANIZED HEALTH CARE EDUCATION/TRAINING PROGRAM

## 2020-04-07 PROCEDURE — 0UCC7ZZ EXTIRPATION OF MATTER FROM CERVIX, VIA NATURAL OR ARTIFICIAL OPENING: ICD-10-PCS | Performed by: OBSTETRICS & GYNECOLOGY

## 2020-04-07 RX ADMIN — BETAMETHASONE SODIUM PHOSPHATE AND BETAMETHASONE ACETATE 12 MG: 3; 3 INJECTION, SUSPENSION INTRA-ARTICULAR; INTRALESIONAL; INTRAMUSCULAR at 20:27

## 2020-04-07 RX ADMIN — ESCITALOPRAM OXALATE 10 MG: 10 TABLET ORAL at 20:26

## 2020-04-07 RX ADMIN — AMPICILLIN SODIUM 2 G: 2 INJECTION, POWDER, FOR SOLUTION INTRAVENOUS at 09:13

## 2020-04-07 RX ADMIN — AMPICILLIN SODIUM 2 G: 2 INJECTION, POWDER, FOR SOLUTION INTRAVENOUS at 02:36

## 2020-04-07 RX ADMIN — AMPICILLIN SODIUM 2 G: 2 INJECTION, POWDER, FOR SOLUTION INTRAVENOUS at 20:20

## 2020-04-07 RX ADMIN — AMPICILLIN SODIUM 2 G: 2 INJECTION, POWDER, FOR SOLUTION INTRAVENOUS at 14:39

## 2020-04-07 NOTE — PROGRESS NOTES
"SPIRITUAL HEALTH SERVICES  SPIRITUAL ASSESSMENT Progress Note  [facility] ([facility]) Pregnancy Special Care Unit (PSCU)     REFERRAL SOURCE: Staff referral for emotional distress    ILLNESS CIRCUMSTANCES:   Reviewed documentation. Introduced SHS. Reflective conversation shared with pt Bailee, which integrated elements of pregnancy and family narratives.   Context of Serious Illness/Symptom(s) - Bailee is currently at 27w4d and is admitted with pprom for the duration of this pregnancy. This is her first pregnancy and she is expecting a baby boy, name undecided at this time.   Resources for Support - Family, friends    DISTRESS:   Emotional/Spiritual/Existential Distress - Bailee expressed her concerns about being admitted to the hospital for the rest of her pregnancy in the context of the COVID-19 visitor restrictions. She articulated sadness about not being able to have family visit her and hope that the restrictions may change in the future. Offered emotional support and realistic expectation that hospital visitor restrictions are unlikely to change soon, even if state guidelines for businesses change in the weeks ahead.     SPIRITUAL/Hinduism COPING:   Protestant/Shaneka/Culture - Bailee identifies as Hmong but says she is not deeply connected to the tradition. She says her  is \"white\" and feels that she is more \"Americanized.\" During this visit she did not express a significant connection with a spiritual/Voodoo tradition but was open to ongoing SHS support during this hospitalization.   Spiritual Practice(s) - Bailee named reading, movies, and games as ways to occupy her time during this extended hospitalization. She identified that human connection with her family and friends is what has historically kept her most grounded.   Meaning/Sense-Making - \"I'm just taking it all one day at a time.\"     PLAN: Will notify unit  of spiritual assessment and of Bailee's openness to follow-up support from [facility]. She is understanding that " follow up may happen in person or by phone. [facility] will follow up about 1x/week during this hospitalization. [facility] remains available for any further needs or requests.     MAUREEN Wilson.   Associate   Pager [phone]    * [facility] remains available 24/7 for emergent requests/referrals, either by having the switchboard page the on-call  or by entering an ASAP/STAT consult in Epic (this will also page the on-call ).*

## 2020-04-07 NOTE — PLAN OF CARE
Patient has remained stable this shift. Cerclage removed at 1200 by Dr Gao, patient tolerated well. Patient denies feeling contractions at this time. Leaking scant fluid, no bleeding present. Denies pain. FHT appropriate, see flow record. Continue with plan of care.

## 2020-04-07 NOTE — PLAN OF CARE
Dr. Velasquez updated on patient arrival, PROM with +ROM plus, 2 contractions in 40 minutes, no bleeding, FHT's reactive per gestational age, history cerclage on 2/6, vital signs including temp 99 temporal.  Plan transfer to [facility], orders received for PROM care.  Discussed plan of care with patient and significant other.

## 2020-04-07 NOTE — PROVIDER NOTIFICATION
04/07/20 1206   Provider Notification   Provider Name/Title Dr Gao   Method of Notification At Bedside   Notification Reason Other (Comment)   Patient's cerclage removed by Dr Gao for PPROM. Per Dr Gao patient needs 20 mins more of monitoring with no contractions and appropriate FHT and can come off continuous monitoring. Continue with plan of care.

## 2020-04-07 NOTE — DISCHARGE INSTRUCTIONS
Discharge Instruction for Undelivered Patients      You were seen for: Membrane Assessment  We Consulted: Dr. Velasquez  You had (Test or Medicine):External fetal monitoring, ROM+ to assess for ruptured membranes, cares and treatment for PROM including Magnesium for neuro protection, antibiotics for  ruptured membranes, Betamethasone for fetal lung maturity     Diet:   per orders at [facility]     Activity:  Count fetal kicks everyday (see handout)     Call your provider if you notice:  Swelling in your face or increased swelling in your hands or legs.  Headaches that are not relieved by Tylenol (acetaminophen).  Changes in your vision (blurring: seeing spots or stars.)  Nausea (sick to your stomach) and vomiting (throwing up).   Weight gain of 5 pounds or more per week.  Heartburn that doesn't go away.  Signs of bladder infection: pain when you urinate (use the toilet), need to go more often and more urgently.  The bag of marino (rupture of membranes) breaks, or you notice leaking in your underwear.  Bright red blood in your underwear.  Abdominal (lower belly) or stomach pain.  *If less than 34 weeks: Contractions (tightenings) more than 6 times in one hour.  Increase or change in vaginal discharge (note the color and amount)  Other: Dr. Gao to follow with care    Follow-up:  Transfer via ambulance to [facility]

## 2020-04-07 NOTE — PROGRESS NOTES
Maternal-Fetal Medicine Progress Note    Bailee Sanchez MRN# [id number]   Age: 29 year old  Gestational age: 27w4d YOB: 1990       Date of Admission: 4/6/2020          Subjective:        Bailee is doing well today.  She denies any contractions, fevers, chills or vaginal bleeding.  She continues to leak clear fluid.  Baby is active.            Objective:          Patient Vitals for the past 24 hrs:   BP Temp Temp src Resp   04/07/20 1514 121/65 98.2  F (36.8  C) Oral 16   04/07/20 1208 -- 98.6  F (37  C) Oral 16   04/07/20 0729 111/67 97.6  F (36.4  C) Oral 16   04/07/20 0237 118/60 97.8  F (36.6  C) Oral 18   04/06/20 2150 117/68 97.8  F (36.6  C) Oral 20            LABS         Results for orders placed or performed during the hospital encounter of 04/06/20 (from the past 24 hour(s))   Chlamydia trachomatis PCR    Specimen: Cervix   Result Value Ref Range    Specimen Description Cervix     Chlamydia Trachomatis PCR Negative NEG^Negative   Neisseria gonorrhoea PCR    Specimen: Cervix   Result Value Ref Range    Specimen Descrip Cervix     N Gonorrhea PCR Negative NEG^Negative   Wet prep    Specimen: Vagina   Result Value Ref Range    Specimen Description Vagina     Wet Prep No clue cells seen     Wet Prep No yeast seen     Wet Prep No motile Trichomonas seen     Wet Prep Moderate  PMNs seen      UA with Microscopic reflex to Culture    Specimen: Urine clean catch; Midstream Urine   Result Value Ref Range    Color Urine Light Yellow     Appearance Urine Clear     Glucose Urine Negative NEG^Negative mg/dL    Bilirubin Urine Negative NEG^Negative    Ketones Urine Negative NEG^Negative mg/dL    Specific Gravity Urine 1.025 1.003 - 1.035    Blood Urine Negative NEG^Negative    pH Urine 6.0 5.0 - 7.0 pH    Protein Albumin Urine Negative NEG^Negative mg/dL    Urobilinogen mg/dL Normal 0.0 - 2.0 mg/dL    Nitrite Urine Negative NEG^Negative    Leukocyte Esterase Urine Negative NEG^Negative    Source Midstream Urine      WBC Urine 2 0 - 5 /HPF    RBC Urine 1 0 - 2 /HPF    Squamous Epithelial /HPF Urine <1 0 - 1 /HPF    Mucous Urine Present (A) NEG^Negative /LPF   CBC with platelets differential   Result Value Ref Range    WBC 13.0 (H) 4.0 - 11.0 10e9/L    RBC Count 3.65 (L) 3.8 - 5.2 10e12/L    Hemoglobin 11.2 (L) 11.7 - 15.7 g/dL    Hematocrit 33.0 (L) 35.0 - 47.0 %    MCV 90 78 - 100 fl    MCH 30.7 26.5 - 33.0 pg    MCHC 33.9 31.5 - 36.5 g/dL    RDW 12.5 10.0 - 15.0 %    Platelet Count 268 150 - 450 10e9/L    Diff Method Automated Method     % Neutrophils 85.1 %    % Lymphocytes 11.1 %    % Monocytes 2.8 %    % Eosinophils 0.5 %    % Basophils 0.1 %    % Immature Granulocytes 0.4 %    Nucleated RBCs 1 (H) 0 /100    Absolute Neutrophil 11.1 (H) 1.6 - 8.3 10e9/L    Absolute Lymphocytes 1.5 0.8 - 5.3 10e9/L    Absolute Monocytes 0.4 0.0 - 1.3 10e9/L    Absolute Eosinophils 0.1 0.0 - 0.7 10e9/L    Absolute Basophils 0.0 0.0 - 0.2 10e9/L    Abs Immature Granulocytes 0.1 0 - 0.4 10e9/L    Absolute Nucleated RBC 0.1     RBC Morphology Normal     Platelet Estimate Normal    ABO/Rh type and screen   Result Value Ref Range    ABO A     RH(D) Pos     Antibody Screen Neg     Test Valid Only At          [facility]    Specimen Expires 2020           Abdomen: Gravid, Soft, Non-tender            Fetal Heart Rate Tracin's, moderate variability, + accels, no decels            Tocometer: Quiet    Assessment: Bailee Sanchez is a 29 year old  at 27w4d by 7w5d US who presents with PPROM at 27w3d in pregnancy notable for cervical insufficiency with US indicated cerclage placed at 18w6d.     Plan:  #PPROM  -Diagnosed by positive ROMplus, pooling on speculum exam  -Infectious w/u: Wet prep negative, UA neg, GC/CT pending  -Magnesium for neuroprotection, s/p load at outside hospital. Continue 2g/hr for 12 hours  -Betamethasone for fetal lung maturity. Second dose due at   -GBS positive, PCN if  laboring  -NICU consultation  -No signs/symptoms of infection on admission  -Continue latency antibiotics   - Continue inpatient expectant until delivery, which is recommended at 34 weeks, or sooner based on maternal or fetal status.      #Cervical insufficiency s/p Mora cerclage  - US indicated cerclage placed at 18w6d  -  Plan cerclage removal today due to risk for chorioamnionitis after PPROM     #Chronic hypertension, not on medications  -BP normal on admission, continue to monitor closely  - No indication for treatment of CHTN and no evidence of SI PE.     #Depression  -Continue home Lexapro     Fetal well-being/PNC  - Plan TID and PRN EFM  - Rh pos, rubella immune. GCT not done yet.  Plan GCT in 2 weeks.  - Marginal cord insertion, fetal growth normal.  Serial growth US every 3-4 weeks and weekly BPP due to PPROM starting next week.    Camron Gao MD    Time Spent on this Encounter   I spent 20 minutes on the unit/floor managing the care of Bailee Sanchez. Over 50% of my time was spent on the following:   - Counseling the patient and/or family regarding: diagnostic results, prognosis, risks and benefits of treatment options, recommended follow-up, medical compliance and prevention of disease  - Coordination of care with the: nurse    In summary, Bailee Sanchez is a  at 27w4d admitted with PPROM yesterday.  Plan cerclage removal today and at this time, both maternal and fetal status are stable and reassuring and recommend continued expectant management with close monitoring of both maternal and fetal status.     Camron Gao MD

## 2020-04-07 NOTE — PLAN OF CARE
"[place] [facility] per services of Dr. Velasquez for evaluation of PROM.  Pt states she noticed a few gushes of fluid starting at 1730.  Denies any cramping or contractions, no bleeding, reports good fetal movement.  Pt states this pregnancy is complicated by a cerclage due to a shortening cervix.  Pt was placed on an anti-depressant medication about a month ago, states \"it's not working yet\".  Denies any other complications with this pregnancy.  History of elevated blood pressure, taken off meds during this pregnancy.  Discussed plan of care including EFM, routine VS, ROM+ and possible SVE.  Pt agreeable.  EFM applied and admission assessment completed.    "

## 2020-04-07 NOTE — H&P
Antepartum History and Physical   2020  Bailee Sanchez  [id number]      HPI: Baliee Sanchez is a 29 year old  at 27w3d by 7w5d US who presents as a transfer from [facility] for PPROM. Patient reports she was at [employer] this evening when she started feeling leaking around 5:30. States it just kept coming and coming. No contractions or cramping. No fevers/chills. Feeling baby move normally. No bleeding    At the outside hospital she was diagnosed with PPROM. She was started on magnesium, ampicillin and was given azithromycin and first dose of BMZ prior to transfer.    Her pregnancy has been complicated by:  - Cervical insufficiency with US-indicated Mora Cerclage placed 2020  - Marginal cord insertion  - Chronic hypertension, not on medications  - Gout, not currently taking prednisone  - Depression on Lexapro    OBHX:   OB History    Para Term  AB Living   1 0 0 0 0 0   SAB TAB Ectopic Multiple Live Births   0 0 0 0 0      # Outcome Date GA Lbr Blaise/2nd Weight Sex Delivery Anes PTL Lv   1 Current                MedicalHX:   Past Medical History:   Diagnosis Date     Depressive disorder      Gout      HSV-1 infection      Hypertension        SurgicalHX: Reviewed   Past Surgical History:   Procedure Laterality Date     CERCLAGE CERVICAL N/A 2020    Procedure: CERCLAGE, CERVIX, VAGINAL APPROACH;  Surgeon: Kaila Ramesh MD;  Location:  L+D     HEAD & NECK SURGERY      WISDOM TEETH EXTRACTION     LE FORT ONE , OSTEOTOMY SAGITTAL SPLIT, COMBINED N/A 2019    Procedure: COMBINED LE FORT ONE, OSTEOTOMY SAGITTAL SPLIT;  Surgeon: Prashanth Dumont DDS;  Location:  OR       Medications:   -Lexapro  -ASA    Allergies:  No Known Allergies    FamilyHX:  Family History   Problem Relation Age of Onset     Hypertension Mother      Gout Father      Hypertension Father      Gout Brother    Reviewed. No family history of bleeding or clotting disorders, issues with anesthesia.    SocialHX: . Non  smoker. Denies alcohol or drug use in pregnancy    ROS: 10-point ROS negative except as indicated in HPI.    Physical Exam:  Vitals:    04/06/20 2150   BP: 117/68   Resp: 20   Temp: 97.8  F (36.6  C)   TempSrc: Oral     General: alert, oriented female, resting in bed in NAD  CV: regular rate and rhythm, well perfused  Lungs: unlabored breathing on room air  Abdomen: soft, gravid, non-tender  Extremities: No edema in b/l lower extremities  : On speculum exam, pooling noted in vaginal vault. Difficult to visualize cervix but appears closed with suture visualized at 12 o'clock position.    Presentation: Cephalic by BSUS    FHT: baseline 130, mod variability, pos accelerations, no decelerations  [place]: quiet    Most recent US:  4/2 EFW 985g     50% Domenic  Cardiac activity present.  bpm.  Fetal movements present.  Presentation cephalic.  Placenta  posterior, marginal cord insertion  Umbilical cord 3 vessel cord.  Amniotic fluid Amount of AF: normal. MVP 4.5 cm.  Patient here for a fetal growth scan secondary to a diagnosis of CHTN. She is at 26w6d gestational age.  Active single fetus with behavior appropriate for gestational age.  Appropriate interval fetal growth.  Estimated fetal weight is appropriate for gestational age.  None of the anomalies commonly detected by ultrasound were evident in the limited fetal anatomic survey described above.  Normal amniotic fluid volume.     Labs:   WBC 13.0 (H)   Hemoglobin 11.2 (L)   Hematocrit 33.0 (L)   Platelet Count 268   RBC Count 3.65 (L)   MCV 90   MCH 30.7   MCHC 33.9   RDW 12.5   Diff Method Automated Method   % Neutrophils 85.1   % Lymphocytes 11.1   % Monocytes 2.8   % Eosinophils 0.5   % Basophils 0.1   % Immature Granulocytes 0.4   Nucleated RBCs 1 (H)   Absolute Neutrophil 11.1 (H)   Absolute Lymphocytes 1.5   Absolute Monocytes 0.4   Absolute Eosinophils 0.1   Absolute Basophils 0.0   Abs Immature Granulocytes 0.1   Absolute Nucleated RBC 0.1   RBC  Morphology Normal   Platelet Estimate Normal   ABO A   RH(D) Pos   UA negative    Assessment: 29 year old  at 27w3d by 7w5d US who presents as a transfer from [facility] for PPROM in pregnancy notable for cervical insufficiency with US indicated cerclage placed at 18w6d.    Plan:  #PPROM  -Diagnosed by positive ROMplus, pooling on speculum exam  -Infectious w/u: Wet prep negative, UA neg, GC/CT pending  -Magnesium for neuroprotection, s/p load at outside hospital. Continue 2g/hr for 12 hours  -Betamethasone for fetal lung maturity. Second dose due at   -GBS positive, PCN if laboring  -NICU consultation  -No signs/symptoms of infection on admission    #Cervical insufficiency  #Mora Cerclage  - US indicated cerclage placed at 18w6d  - If evidence of labor or infection would plan removal of cerclage, discussed this with patient    #Chronic hypertension, not on medications  -BP normal on admission, continue to monitor  -Unable to see baseline HELLP labs in Epic    #Depression  -Continue home Lexapro    Fetal well-being/PNC  - FHT reactive and reassuring, appropriate for GA. Plan TID monitoring once off of magnesium  - Rh pos, rubella immune. GCT not done yet.   - Marginal cord insertion, fetal growth normal    Patient seen and care plan discussed under supervision of Dr. Gao.    Vandana Shetty MD PGY2    Physician Attestation   I, Camron Gao MD, saw this patient with the resident and agree with the resident/fellow's findings and plan of care as documented in the note.      I personally reviewed vital signs, medications, labs, imaging and EFM.    Key findings: In summary, Bailee Sanchez is a  at 27w3d admitted with PPROM.  Her pregnancy is complicated by cervical insufficiency s/p cerclage placement at 18w6d.  Currently maternal and fetal status are stable and reassuring and there is no identified contraindication to expectant management.  Plan to proceed with cerclage removal as well given  PPROM and risk for infection/chorioamnionitis.  Will continue antibiotics to prolong latency and will repeat BMZ dose today.  Plan close inpatient monitoring until delivery, which is recommended at 34 weeks, or sooner if  labor, non-reassuring fetal status or concern for chorioamnionitis.  Plan reviewed with Bailee in detail and she expressed agreement and understanding.      Camron Gao MD  Date of Service (when I saw the patient): 20    Time Spent on this Encounter   I spent 30 minutes on the unit/floor managing the care of Bailee Sanchez. Over 50% of my time was spent on the following:   - Counseling the patient and/or family regarding: diagnostic results, prognosis, risks and benefits of treatment options, recommended follow-up, medical compliance and prevention of disease  - Coordination of care with the: nurse    In summary, Bailee Sanchez is a  at 27w3d admitted with PPROM.  Her pregnancy is complicated by cervical insufficiency s/p cerclage placement at 18w6d.  Currently maternal and fetal status are stable and reassuring and there is no identified contraindication to expectant management.  Plan to proceed with cerclage removal as well given PPROM and risk for infection/chorioamnionitis.  Will continue antibiotics to prolong latency and will repeat BMZ dose today.  Plan close inpatient monitoring until delivery, which is recommended at 34 weeks, or sooner if  labor, non-reassuring fetal status or concern for chorioamnionitis.  Plan reviewed with Bailee in detail and she expressed agreement and understanding.      Camron Gao MD

## 2020-04-07 NOTE — PROCEDURES
Given  premature rupture of membranes, cerclage suture removal is recommended.  A speculum was placed in the vagina and the cerclage suture was identified and transected with scissors and the cerclage was easily removed in its entirety.  Positive pooling of fluid was noted during the exam and the cervix visually appeared closed at the end of the cerclage removal procedure.      Camron Gao MD

## 2020-04-07 NOTE — PLAN OF CARE
Antepartum Transfer from another facility admit note  Bailee Sanchez     Primary Care Provider:Murray  MRN:  [id number]    Primary Clinic:  Gestational Age: 27w4d   Transferring Facility:[place] [address]    Bailee Sanchez arrived on 2020 at 2150 via ambulace from [facility] for evaluation and treatment of PPROM.  Prenatal records and transferring facility records sent with patient.  /68   Temp 97.8  F (36.6  C) (Oral)   Resp 20   LMP 2019 .      EFM see flowsheet  Bleeding: absent.  Vaginal Discharge: reports small of clear fluid.    IV: Infusing.   Medications: See MAR: given at referring facility.  Labs: See record.    Dr Shetty notified of arrival & condition.  Oriented patient to surroundings and [facility].  Given The Expectant Family booklet & High Risk Pregnancy Handout.  Call light within reach.     Plan:   -uterine/fetal monitoring Continuous  -Labs:UA, WET PREP, GBS and GC/ CHLAMYDIA, Ordered,   -Magnesium Sulfate for salas protection running  -betamethasone Previously done  -latency antibiotics Ordered  -/ consults prn

## 2020-04-07 NOTE — H&P
"CC: Premature Rupture of Membranes    HPI: 28yo  in MAC for gush of fluid noted at 5:30PM while at the store.  aBilee reports trickle of fluid followed by large gushes which left her underwear and pants \"soaked\".  Now a bit slower but still feeling a constant trickle.  Bailee denies cramping or contractions.  No vb/spotting.  Bailee has history of in indicental cervical shortening (7.7mm with funneling) noted at time of Level II US on .  Underwent single Mora cerclage placement at that time.  No issues since that time.    PMHx:   CHTN --was on losartan from 2017 until pregnancy --no medications at this time and normal home BP monitoring  Gout --last treated with prednisone per Dr. Murguia on   HSV 1 --no genital lesions  Depression --started on lexapro    PSHx:   20 -SMD cervical cerclage  [place] teeth    Meds: ASA, lexapro  All: NKDA    Fam Hx: non contributory; HTN for mother and father    Social Hx: partner -James; works in [employer] for [employer]  Non smoker; no EtOH or illicit drug use    ROS: no fever, chills  No cramping; no vaginal bleeding  No bowel/bladder issues  No n/v; no headaches; no sob/chest pain/palpitations    PE:   AF, VSS; T: 99, /66, P  100, R: 18, 98%  Gen: calm, comfortable per phone  Abd: nontender per RN  FHT: baseline 140  [place]: quiet    Last MFM US () vtx, EFW 985g (50%ile), post placenta, marginal cord insertion;  Cervix not visualized    A/P: 28yo  with PPROM at 27+3wks with SMD cerclage in place  --discussed situation by phone with Bailee; discussed early gestation and need for transfer at <30wks for NICU services.  Discussed PPROM and risks of  labor, infection, abruption.  Discussed BMZ course to help minimize risks/complications of prematurity.  Discussed risk of  labor wtihin first 48hrs as well as in first week.  Discussed latency antibiotics.  Discussed cerclage and management if labor or infection were to occur.  Discussed current visitor restrictions with COVID " concerns.  Patient understands and accepts transfer    --will transfer to [place] given early gestation and PPROM; discussed with Dr. Gao who has accepted this transfer  --will start BMZ for fetal lung maturity  --will start amp/azithro for latency antibiotics  --will start magnesium for neuroprotection  --will leave cerclage in place at this time and discussed removal in setting of infection or labor  --CHTN --close monitoring of blood pressures; no medications at this time    Jannette Velasquez MD

## 2020-04-07 NOTE — PLAN OF CARE
Afebrile, VSS. Pt denies bldg, pain, ctx. Leaking clear fluid. Mg Sulfate on for NP, will be off @ 0830. EFM as charted. Getting prophylactic abx per protocol. Continue to monitor, contact MD with questions/concerns.

## 2020-04-07 NOTE — PLAN OF CARE
2018- Dr. Velasquez calls back- plan transfer to [place], Dr. Gao to accept care.  Dr. Velasquez phone call transferred into patient room to discuss plan with patient and significant other.   2035- [facility] contacted and notified of transfer.  ETA 30-45 minutes, report given  2040- Report given to Noa TURNER at [place] Antepartum unit  2100- [facility] ambulance arrival, report given  2111- Pt transferred, care turned over to ambulance crew. 4gm Mag load given, 2gm Mag maintenance now running, Betamethasone given, PO Zithromax given as ordered, IV Ampicillin infusing.  Pt tolerating Magnesium.  Significant other at side, aware of visitor restrictions.

## 2020-04-08 PROCEDURE — 12000001 ZZH R&B MED SURG/OB UMMC

## 2020-04-08 PROCEDURE — 25000132 ZZH RX MED GY IP 250 OP 250 PS 637: Performed by: STUDENT IN AN ORGANIZED HEALTH CARE EDUCATION/TRAINING PROGRAM

## 2020-04-08 PROCEDURE — 25000128 H RX IP 250 OP 636: Performed by: STUDENT IN AN ORGANIZED HEALTH CARE EDUCATION/TRAINING PROGRAM

## 2020-04-08 RX ORDER — PRENATAL VIT/IRON FUM/FOLIC AC 27MG-0.8MG
1 TABLET ORAL DAILY
Status: DISCONTINUED | OUTPATIENT
Start: 2020-04-08 | End: 2020-04-10

## 2020-04-08 RX ORDER — ASPIRIN 81 MG/1
81 TABLET ORAL DAILY
Status: DISCONTINUED | OUTPATIENT
Start: 2020-04-08 | End: 2020-04-10

## 2020-04-08 RX ORDER — FOLIC ACID 1 MG/1
1 TABLET ORAL DAILY
Status: DISCONTINUED | OUTPATIENT
Start: 2020-04-08 | End: 2020-04-10

## 2020-04-08 RX ADMIN — ESCITALOPRAM OXALATE 10 MG: 10 TABLET ORAL at 21:58

## 2020-04-08 RX ADMIN — AMPICILLIN SODIUM 2 G: 2 INJECTION, POWDER, FOR SOLUTION INTRAVENOUS at 02:26

## 2020-04-08 RX ADMIN — ASPIRIN 81 MG: 81 TABLET, DELAYED RELEASE ORAL at 21:57

## 2020-04-08 RX ADMIN — PRENATAL VITAMINS-IRON FUMARATE 27 MG IRON-FOLIC ACID 0.8 MG TABLET 1 TABLET: at 21:58

## 2020-04-08 RX ADMIN — FOLIC ACID 1 MG: 1 TABLET ORAL at 21:58

## 2020-04-08 RX ADMIN — AMOXICILLIN 250 MG: 250 CAPSULE ORAL at 21:57

## 2020-04-08 RX ADMIN — AMPICILLIN SODIUM 2 G: 2 INJECTION, POWDER, FOR SOLUTION INTRAVENOUS at 14:31

## 2020-04-08 RX ADMIN — AMPICILLIN SODIUM 2 G: 2 INJECTION, POWDER, FOR SOLUTION INTRAVENOUS at 08:39

## 2020-04-08 NOTE — PROVIDER NOTIFICATION
04/08/20 1357   Provider Notification   Provider Name/Title Dr. Myhre   Method of Notification In Department   Request Evaluate - Remote   Notification Reason Other (Comment)     Reviewed strip. ok'd pt to come off monitoring.

## 2020-04-08 NOTE — CONSULTS
[facility]  MATERNAL CHILD HEALTH    PSYCHOSOCIAL ASSESSMENT     DATA:     Presenting Information: Bailee is a  at 27w5d admitted to the antepartum unit through delivery d/t PPROM.  SW received consult for antepartum admission.    Living Situation: Bailee and her spouse, James Islas, reside together in [place], [place].  Their baby boy is their first child.    Social Support: Bailee described her spouse, James, as being a primary source of support in addition to her mother who she talks with daily.  She said that her family is supportive and available and they are currently trying to have group chats daily.    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  She is coordinating her leave benefits when needed and denied assistance needed at this time.  James is an  and still working, but hours have been reduced as related to COVID.    Finances and Insurance: Employment, although d/t COVID James's hours are reduced.  Bailee's job is currently stable, but she is unsure if there will be changes, and she has missed some days d/t her health issues.  SW provided information on financial resources available related to loss of income for antepartum/NICU admissions.  Bailee has [employer] primary insurance and Medicaid [place] secondary.    Mental Health:  Bailee reported having depression and is taking Lexapro for it.  She said that she has a psychiatrist at [facility] and was supposed to have a follow-up appointment that she will be unable to make since she is admitted through delivery.  She is hopeful to speak with a psychiatrist at this hospital, and SW agreed to f/u on this on her behalf.  She explained that her spouse has been unable to visit because he still has to work, and d/t COVID cannot visit/leave.  She is trying to stay connected to him and her family with video chat, but endorses missing human interaction.   She is utilizing other coping tools such as games on her phone, television, reading, and plans to start journaling.  She also was recently cleared to start working remotely from the hospital, and hopeful that this schedule structure and distraction will be beneficial.    Community Resources//Baby Supplies: Bailee has been preparing for baby at home, and has many items including a car seat, stroller, etc. She had a baby shower planned and discussed how this will be postponed until after baby is born likely.    INTERVENTION:       SW completed chart review, collaborated with multidisciplinary team, and completed assessment.    Introduction to social work role and provided direct contact information.    Reviewed Hospital and Community Resources.    Provided information on financial support through [employer] and [employer].    Provided information on Pregnancy and Postpartum Support of [place] (PPSM) online support, Postpartum Support International (PSI) virtual group, and Sidelines (high-risk pregnancy support).    Assessed Mental Health History and Current Symptoms.    Identified stressors, barriers and family concerns.    Offered active listening and supportive counseling.    ASSESSMENT:     Bailee was welcoming of SW consult and easily engaged.  She was forthcoming about her MH needs, is feeling very isolated during this admission, and the visitor restrictions are very challenging.  She is not acutely distressed, but aware that the isolation is starting to impact her, and she is welcoming of additional mental health support during this time.  She will be unable to follow-up with her OP psychiatrist as scheduled, and hopeful that she can have a consult while in antepartum.  She was receptive to support and resources offered today, and seems to be utilizing emotional support from her family as able.     PLAN:     YANDEL spoke with MD morocho: consults for psychiatry and  health psychology.  As agreed  upon, SW emailed Ka resources related to online support groups/forums and financial support programs following assessment. YANDEL will continue to follow.    Shelia Coelho [facility]  Clinical   Maternal Child Health  Phone: [phone]  Pager: [phone]

## 2020-04-08 NOTE — PLAN OF CARE
Pt alert, active, and stable. No signs or symptoms of distress. VSS.  Denies bleeding, contractions, and pain. See flow sheet for FHR interpretation. No questions or concerns at this time. Will continue to monitor pt closely.

## 2020-04-08 NOTE — CONSULTS
[facility]                Neonatology Antepartum Counseling Consult:  I was asked to provide antepartum counseling for Bailee Sanchez at the request of Dr. Camron Gao MD secondary to PPROM (4/6), cervical insufficency, and marginal cord insertion. Ms. Bailee Sanchez is currently 27 weeks/4 days gestation and has a history significant for:  Patient Active Problem List   Diagnosis     Maxillary hypoplasia     Supervision of high-risk pregnancy     Short cervix     Pregnancy related condition     Premature rupture of membranes     Amniotic fluid leaking      Betamethasone was administered on 4/6/2020. The second dose will be administered ~ 2040 on 4/7/2020.   This NICU consult was completed over the phone due to precautions for COVID-19. Ms. Bailee Sanchez, accompanied over the phone by her , James, was counseled on the expected hospital course, potential risks, and outcomes associated with an infant born at this gestation. The counseling included: initial delivery room stabilization, respiratory course, lung development, patent ductus arteriosus, retinopathy of prematurity, hyperbilirubinemia, hemodynamic support, infection (including NEC), intraventricular hemorrhage, nutrition, growth and development, and long term outcomes.    I also explained the basic four criteria for discharge: that the baby had to be free of apnea; able to maintain their body temperature; able to feed by bottle or breast well enough to; attain an adequate pattern of weight gain and growth. Bailee would like to primarily breastfeed this baby boy. Also of note, Latoya live in [facility] and expressed some interest in possibly transferring to [facility] NICU if/when their son is medically appropriate.     The patient had no remaining questions but was encouraged to contact the NICU via their caregivers should any arise.  Please feel free to call and thank you for involving  the NICU team in the care of your patient.      Floor Time (min): 10  Face to Face Time (phone consult) (min): 30  Total Time (minutes): 40  More than 50% of my time was spent in direct, face to face, antepartum counseling with the above patient.    Sowmya Keith PA-C 2020 8:12 PM  [facility]   Advanced Practice Providers

## 2020-04-08 NOTE — PROGRESS NOTES
MFM Antepartum Progress Note    Subjective:   Bailee reports that she is feeling well this morning. Continues to leak clear fluid. Feels active fetal movement. Denies fever, chills, or abdominal pain.       Objective:  Vitals:    20 1514 20 2045 20 0000 20 0400   BP: 121/65 114/55     Resp: 16 16     Temp: 98.2  F (36.8  C) 97.8  F (36.6  C) 98.2  F (36.8  C) 98.2  F (36.8  C)   TempSrc: Oral Oral Oral Oral   HR: 88  Gen: Resting comfortably in bed, NAD  CV: Regular rate, well perfused  Resp:no increased work of breathing  Abd: Gravid, non-tender, non-distended  Ext: non-tender, no edema      FHT: , moderate variability, present accels, absent decels  [place]: uterine irritability     Assessment/Plan:    Bailee Sanchez is a 29 year old  at 27w5d by 7w5d US who is HD#2 with PPROM at 27w3d in pregnancy notable for cervical insufficiency with US indicated cerclage placed at 18w6d.      PPROM  -Diagnosed by positive ROMplus, pooling on speculum exam  -Infectious w/u: Wet prep negative, UA neg, GC/CT pending  - s/p Magnesium for neuroprotection,  -s/p Betamethasone for fetal lung maturity. (- )  -GBS positive, PCN if laboring  -s/p NICU consultation  -No signs/symptoms of infection   -Continue latency antibiotics   - Continue inpatient expectant until delivery, which is recommended at 34 weeks, or sooner based on maternal or fetal status.      Cervical insufficiency s/p Mora cerclage  - US indicated cerclage placed at 18w6d  - s/p cerclage removal today due to risk for chorioamnionitis after PPROM     Chronic hypertension, not on medications  -BP normal on admission, continue to monitor closely  - No indication for treatment of CHTN and no evidence of SI PE.     Depression  -Continue home Lexapro     Fetal well-being/PNC  - Plan TID and PRN EFM  - Rh pos, rubella immune. GCT not done yet.  Plan GCT in 2 weeks.  - Marginal cord insertion, fetal growth normal.  Serial growth US every 3-4 weeks  and weekly BPP due to PPROM starting next week  - 1 prolonged 2 minute deceleration at 0650 this morning. Category I since then. OK to continue with TID monitoring.     Alicia Myhre, DO  Obstetrics and Gynecology, PGY-3  2020 , 8:24 AM     Physician Attestation   I, Camron Gao MD, saw this patient with the resident and agree with the resident/fellow's findings and plan of care as documented in the note.      I personally reviewed vital signs, medications, labs, imaging and EFM.    Key findings: In summary, Bailee Sanchez is a  at 27w5d admitted with PPROM at 27w3d.  Both maternal and fetal status are stable and reassuring and recommend continued close inpatient management until delivery.    Camron Gao MD  Date of Service (when I saw the patient): 20    Time Spent on this Encounter   I spent 15 minutes on the unit/floor managing the care of Bailee Sanchez. Over 50% of my time was spent on the following:   - Counseling the patient and/or family regarding: diagnostic results, prognosis, risks and benefits of treatment options and recommended follow-up  - Coordination of care with the: nurse and patient     In summary, Bailee Sanchez is a  at 27w5d admitted with PPROM at 27w3d.  Both maternal and fetal status are stable and reassuring and recommend continued close inpatient management until delivery.    Camron Gao MD

## 2020-04-08 NOTE — DISCHARGE SUMMARY
[facility] Discharge Summary    Bailee Sanchez MRN# [id number]   Age: 29 year old YOB: 1990     Date of Admission:  2020  Date of Discharge:  20   Admitting Physician:  Camron Gao MD  Discharge Physician:  Albina Keene MD     Admission Diagnosis:  - at 27w3d  - premature rupture of membranes  -History of cervical insufficiency with Mora cerclage in place    Discharge Diagnosis:  -same as above s/p  delivery  -Suspected Triple I    Procedures:    -Cerclage removal  -Betamethasone course  -Weekly BPP  -Latency antibiotics  -Fetal monitoring  -Epidural  -    Consultations:    -NICU  -Social work    Medications prior to admission:  Medications Prior to Admission   Medication Sig Dispense Refill Last Dose     aspirin (ASA) 81 MG EC tablet Take 1 tablet (81 mg) by mouth daily 90 tablet 0      Biotin 5000 MCG CAPS Take 5,000 mcg by mouth every morning        escitalopram (LEXAPRO) 10 MG tablet TK 1 T PO D        folic acid (FOLVITE) 1 MG tablet Take 1 mg by mouth daily        Prenat w/o V-HC-Fjrslen-FA-DHA (PNV-DHA PO)         [DISCONTINUED] predniSONE (DELTASONE) 20 MG tablet Take 1 tablet (20 mg) by mouth daily for 5 days 5 tablet 0      [DISCONTINUED] predniSONE (DELTASONE) 20 MG tablet Take two tablets (= 40mg) each day for 5 (five) days (Patient not taking: Reported on 2020) 10 tablet 0      Brief History of Presentation:  Bailee Sanchez is a 29 year old  at 27w3d by 7w5d US who presents as a transfer from [facility] for PPROM. On  patient reported that she started feeling leaking around 5:30pm. States it just kept coming and coming. No contractions or cramping. No fevers/chills. Feeling baby move normally. No bleeding     At the outside hospital she was diagnosed with PPROM. She was started on magnesium, ampicillin and was given azithromycin and first dose of BMZ prior to transfer.    Hospital/Labor Course:   Patient completed BMZ course  upon admission and received 12 hours of magnesium. She was started on latency antibiotics. She underwent daily fetal monitoring and had weekly BPP. Her cerclage was removed on HD#2. On HD#5, at 28w0d she reported increasing abdominal pain and was found to be donal with cervix dilated to 5cm. She had exquisite tenderness to palpation on exam and was started on IV ampicillin/gentamicin due to high concern for Triple I. She was afebrile. She was GBS positive and received the aforementioned antibiotics.  She was restarted on IV magnesium for fetal neuroprotection. She received an epidural for pain control. She progressed to 8cm when her contractions spaced out and her labor was augmented with pitocin in the setting of suspected Triple I. She progressed to complete, pushed with excellent maternal effort, and delivered a liveborn male infant from OA position at 1325 on 4/10/2020. No nuchal cord. APGARs 3, 8, and 8 at 1, 5, and 10 minutes. Weight 3 pounds. Cord allowed to pulse for 1 minute, then clamped and cut. IV pitocin was started. Placenta delivered spontaneously with gentle cord traction and suprapubic countertraction; intact 3V cord, placenta intact.  Examination of perineum revealed no lacerations.  . Additional uterotonics given: misoprostol 400mcg buccal. Fundus firm and perineum hemostatic.  Dr. Gavin present for delivery.     Postpartum Course:  Her postpartum course was uncopmlicated. On PPD#1, she was meeting all of her postpartum goals and deemed stable for discharge. She was voiding without difficulty, tolerating a regular diet without nausea and vomiting, her pain was well controlled on oral pain medicines and her lochia was appropriate. Her hemoglobin prior to delivery was 11.2 and after delivery was 11.4. Her Rh status was pos, and Rhogam was not indicated.     Discharge Medications:     Review of your medicines      START taking      Dose / Directions   acetaminophen 325 MG  tablet  Commonly known as:  TYLENOL      Dose:  650 mg  Take 2 tablets (650 mg) by mouth every 4 hours as needed for mild pain or fever  Quantity:  60 tablet  Refills:  1     ibuprofen 800 MG tablet  Commonly known as:  ADVIL/MOTRIN      Dose:  800 mg  Take 1 tablet (800 mg) by mouth every 8 hours as needed for other (cramping)  Quantity:  60 tablet  Refills:  1     senna-docusate 8.6-50 MG tablet  Commonly known as:  SENOKOT-S/PERICOLACE      Dose:  1 tablet  Take 1 tablet by mouth 2 times daily  Quantity:  60 tablet  Refills:  1        CONTINUE these medicines which have NOT CHANGED      Dose / Directions   aspirin 81 MG EC tablet  Commonly known as:  ASA  Used for:  Chronic hypertension affecting pregnancy      Dose:  81 mg  Take 1 tablet (81 mg) by mouth daily  Quantity:  90 tablet  Refills:  0     Biotin 5000 MCG Caps      Dose:  5,000 mcg  Take 5,000 mcg by mouth every morning  Refills:  0     escitalopram 10 MG tablet  Commonly known as:  LEXAPRO      TK 1 T PO D  Refills:  0     folic acid 1 MG tablet  Commonly known as:  FOLVITE      Dose:  1 mg  Take 1 mg by mouth daily  Refills:  0     PNV-DHA PO      Refills:  0        STOP taking    predniSONE 20 MG tablet  Commonly known as:  DELTASONE              Where to get your medicines      These medications were sent to [facility] [address]  [address] [address]    Phone:  [phone]     acetaminophen 325 MG tablet    ibuprofen 800 MG tablet    senna-docusate 8.6-50 MG tablet       Discharge/Disposition:  Bailee Sanchez was discharged to home in stable condition with the following instructions/medications:  1) Call for temperature > 100.4, bright red vaginal bleeding >1 pad an hour x 2 hours, foul smelling vaginal discharge, pain not controlled by usual oral pain meds, persistent nausea and vomiting not controlled on medications  2) For feeding she decided to breastfeed.  4) She was instructed to  follow-up with her primary OB in 6 weeks for a routine postpartum visit.    Carmina Pickard MD  OB/GYN PGY-2  04/11/20 11:40 AM

## 2020-04-08 NOTE — PLAN OF CARE
VSS. Patient leaking clear fluid.  Denies vaginal bleeding, cramping, or pain.  Notes positive fetal movment.  Extended fetal monitoring time last night for decels, see flow sheet.  Morning monitoring in process.  Continue IV antibiotics.

## 2020-04-08 NOTE — PLAN OF CARE
VSS, afebrile. Denies feeling contractions, occasional contractions on monitor. 2nd dose of betamethasone administered at 2030. IV Ampicillin given per orders. FHR intermittent decelerations present, prolonged/variable/late, see flowsheets for details. Notified Dr. Pickard of FHR decelerations, provider reviewed tracing and stated to extend monitoring for an hour after most recent deceleration. Continues to be on the monitor at this time. Report given to YUE REZA.

## 2020-04-09 LAB
ABO + RH BLD: NORMAL
ABO + RH BLD: NORMAL
BLD GP AB SCN SERPL QL: NORMAL
BLOOD BANK CMNT PATIENT-IMP: NORMAL
SPECIMEN EXP DATE BLD: NORMAL

## 2020-04-09 PROCEDURE — 25000132 ZZH RX MED GY IP 250 OP 250 PS 637: Performed by: STUDENT IN AN ORGANIZED HEALTH CARE EDUCATION/TRAINING PROGRAM

## 2020-04-09 PROCEDURE — 86900 BLOOD TYPING SEROLOGIC ABO: CPT | Performed by: STUDENT IN AN ORGANIZED HEALTH CARE EDUCATION/TRAINING PROGRAM

## 2020-04-09 PROCEDURE — 86901 BLOOD TYPING SEROLOGIC RH(D): CPT | Performed by: STUDENT IN AN ORGANIZED HEALTH CARE EDUCATION/TRAINING PROGRAM

## 2020-04-09 PROCEDURE — 99221 1ST HOSP IP/OBS SF/LOW 40: CPT | Performed by: NURSE PRACTITIONER

## 2020-04-09 PROCEDURE — 36415 COLL VENOUS BLD VENIPUNCTURE: CPT | Performed by: STUDENT IN AN ORGANIZED HEALTH CARE EDUCATION/TRAINING PROGRAM

## 2020-04-09 PROCEDURE — 86850 RBC ANTIBODY SCREEN: CPT | Performed by: STUDENT IN AN ORGANIZED HEALTH CARE EDUCATION/TRAINING PROGRAM

## 2020-04-09 PROCEDURE — 12000001 ZZH R&B MED SURG/OB UMMC

## 2020-04-09 RX ORDER — ESCITALOPRAM OXALATE 10 MG/1
10 TABLET ORAL AT BEDTIME
Status: DISCONTINUED | OUTPATIENT
Start: 2020-04-09 | End: 2020-04-10

## 2020-04-09 RX ADMIN — AMOXICILLIN 250 MG: 250 CAPSULE ORAL at 08:28

## 2020-04-09 RX ADMIN — AMOXICILLIN 250 MG: 250 CAPSULE ORAL at 20:05

## 2020-04-09 RX ADMIN — ESCITALOPRAM OXALATE 10 MG: 10 TABLET ORAL at 21:49

## 2020-04-09 RX ADMIN — AMOXICILLIN 250 MG: 250 CAPSULE ORAL at 14:17

## 2020-04-09 RX ADMIN — ASPIRIN 81 MG: 81 TABLET, DELAYED RELEASE ORAL at 21:49

## 2020-04-09 RX ADMIN — PRENATAL VITAMINS-IRON FUMARATE 27 MG IRON-FOLIC ACID 0.8 MG TABLET 1 TABLET: at 21:49

## 2020-04-09 RX ADMIN — FOLIC ACID 1 MG: 1 TABLET ORAL at 21:49

## 2020-04-09 NOTE — PLAN OF CARE
Pt is  27.6 wks here w PPROM on  & marginal cord insertion.  Per pt, she had seen yellow discharge.  Pad in bathroom clear.  Afrebile.  No uterine tenderness. +FM.  No s/s of PTL. Plan to monitor today at 1200 as pt has a work meeting at 1430.  Call light is within reach.  Pt states understanding to put on call light if any changes in her condition.  Seen by Dr Gao and Dr Echevarria.

## 2020-04-09 NOTE — PROGRESS NOTES
MFM Antepartum Progress Note    Subjective:   Bailee Sanchez reports that she is doing well today. No complaints. Denies any contractions, vaginal bleeding, fevers, chills. Continues to leak clear fluid. Normal fetal movement.     Objective:  Vitals:    20 2332 20 0330 20 0605 20 0825   BP: 103/52  103/57 110/69   Pulse:    76   Resp: 18 16 16 18   Temp: 98.4  F (36.9  C) 98.1  F (36.7  C) 97.7  F (36.5  C) 98.4  F (36.9  C)   TempSrc: Oral Oral Oral Oral   Gen: Resting comfortably in bed, NAD  CV: Regular rate  Resp: Non labored respirations  Abd: Gravid, non-tender, non-distended  Ext: non-tender, no edema    FHT: BL 135s, mod variability, accels, no decels  [place]: none    Assessment: Bailee Sanchez is a 29 year old  @ 27w6d by 7w5d US who presents as a transfer from [facility] for PPROM in pregnancy notable for cervical insufficiency with US indicated cerclage placed at 18w6d.     Plan:  #PPROM  -Diagnosed by positive ROMplus, pooling on speculum exam  -Infectious w/u: Wet prep negative, UA neg, GC/CT pending  -Cerclage placed at 18w6d, US indicated. Removed on .   -Magnesium for neuroprotection, s/p load at outside hospital. Restart if delivery immanent.   -S/p betamethasone course (-) for fetal lung maturity.   -GBS positive, PCN if laboring  -S/p NICU consult  -No signs/symptoms of infection       #Chronic hypertension  - Not on medications  - Blood pressure normal  - Baseline HELLP labs ordered with next type and screen. UPC ordered     #Depression  -Continue home Lexapro  - Mood overall okay, patient had planned on following up with psychiatry   - Consulted psychiatry for medication management as well as  psychology.      Fetal well-being/PNC  - FHT reactive and reassuring, appropriate for GA.   - TID monitoring   - Rh pos, rubella immune. GCT not done yet.   - Marginal cord insertion, fetal growth normal    Dispo: inpatient until delivery    Sharmaine Echevarria MD   OB/GYN Resident  PGY-3  2020 10:14 AM    Physician Attestation   I, Camron Gao MD, saw this patient with the resident and agree with the resident/fellow's findings and plan of care as documented in the note.      I personally reviewed vital signs, medications, labs, imaging and EFM.    Key findings: In summary, Bailee Sanchez is a  at 27w6d admitted with PPROM at 27w3d.  Both maternal and fetal status are stable and reassuring and recommend continued close inpatient management until delivery.    Camron Gao MD  Date of Service (when I saw the patient): 20    Time Spent on this Encounter   I spent 15 minutes on the unit/floor managing the care of Bailee Sanchez. Over 50% of my time was spent on the following:   - Counseling the patient and/or family regarding: diagnostic results, prognosis, risks and benefits of treatment options, recommended follow-up and medical compliance  - Coordination of care with the: nurse and patient    In summary, Bailee Sanchez is a  at 27w6d admitted with PPROM at 27w3d.  Both maternal and fetal status are stable and reassuring and recommend continued close inpatient management until delivery.    Camron Gao MD

## 2020-04-09 NOTE — PLAN OF CARE
VSS. Pt. States she slept ok during the night. Denies contractions, cramping, bleeding, or pain. Leaking clear fluid. See flowsheets for monitoring. +FM. Pt. Educated when to call for RN. Verbalizes understanding. Continue plan of care.

## 2020-04-09 NOTE — PROGRESS NOTES
FHT Strip Review     baseline, moderate variability, present accels, absent decels  [place]: no contractions    Category I, Reactive    Alicia Myhre, DO  Obstetrics and Gynecology, PGY-3  April 8, 2020 , 9:19 PM

## 2020-04-09 NOTE — PROGRESS NOTES
See note from psych NP this morning.  Clarified with pt and updated Dr Echevarria.  Pt would like to continue her evening Lexapro at this time.  She plans to discuss with her psychiatrist on Wednesday during a virtual meeting.  Per pt her first  Therapist appt is scheduled on 4/22 w a new therapist.  Dr Echevarria updated on all.  Pt will keep us informed.   order will be placed.  Pt prefers in person sessions.

## 2020-04-09 NOTE — PLAN OF CARE
Data: Afebrile. Leaking scant  amounts of clear fluid. Contraction pattern stable and within parameters. Fetal assessment Appropriate for Gestational Age. Signs and symptoms of infection absent.  Interventions: Monitor vital signs and indicators of infection every 4 hours while awake. Continue uterine/fetal assessment TID and PRN. Activity level: Bed rest with bathroom privileges. Preventive measures include Positioning and Frequent voiding. Encourage active range of motion and frequent position changes.  Plan: Continue expectant management. Observe for and notify care provider of indicators of progressing labor, signs/symptoms of infection, or fetal/maternal compromise.
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
## 2020-04-09 NOTE — PROGRESS NOTES
Notified by RN of deceleration that resolved spontaneously.     FHT reviewed: baseline 150, moderate variability, present 10x10 accelerations. Deceleration lasting 2 minutes, lacho to 90.     Rentchler: no contractions on TOCO    Category II for deceleration. Overall reassuring with moderate variability and reactive.     Plan to continue EFM.     Alicia Myhre, DO  Obstetrics and Gynecology, PGY-3  April 8, 2020 , 8:02 PM

## 2020-04-10 ENCOUNTER — ANESTHESIA EVENT (OUTPATIENT)
Dept: OBGYN | Facility: CLINIC | Age: 30
End: 2020-04-10
Payer: COMMERCIAL

## 2020-04-10 ENCOUNTER — ANESTHESIA (OUTPATIENT)
Dept: OBGYN | Facility: CLINIC | Age: 30
End: 2020-04-10
Payer: COMMERCIAL

## 2020-04-10 LAB — GENTAMICIN SERPL-MCNC: <0.2 MG/L

## 2020-04-10 PROCEDURE — 80170 ASSAY OF GENTAMICIN: CPT | Performed by: OBSTETRICS & GYNECOLOGY

## 2020-04-10 PROCEDURE — 72200001 ZZH LABOR CARE VAGINAL DELIVERY SINGLE

## 2020-04-10 PROCEDURE — 25000128 H RX IP 250 OP 636: Performed by: STUDENT IN AN ORGANIZED HEALTH CARE EDUCATION/TRAINING PROGRAM

## 2020-04-10 PROCEDURE — 88307 TISSUE EXAM BY PATHOLOGIST: CPT | Performed by: STUDENT IN AN ORGANIZED HEALTH CARE EDUCATION/TRAINING PROGRAM

## 2020-04-10 PROCEDURE — 25800030 ZZH RX IP 258 OP 636: Performed by: OBSTETRICS & GYNECOLOGY

## 2020-04-10 PROCEDURE — 36415 COLL VENOUS BLD VENIPUNCTURE: CPT | Performed by: OBSTETRICS & GYNECOLOGY

## 2020-04-10 PROCEDURE — 25000125 ZZHC RX 250: Performed by: STUDENT IN AN ORGANIZED HEALTH CARE EDUCATION/TRAINING PROGRAM

## 2020-04-10 PROCEDURE — 00HU33Z INSERTION OF INFUSION DEVICE INTO SPINAL CANAL, PERCUTANEOUS APPROACH: ICD-10-PCS | Performed by: STUDENT IN AN ORGANIZED HEALTH CARE EDUCATION/TRAINING PROGRAM

## 2020-04-10 PROCEDURE — 25000132 ZZH RX MED GY IP 250 OP 250 PS 637

## 2020-04-10 PROCEDURE — 25000128 H RX IP 250 OP 636: Performed by: OBSTETRICS & GYNECOLOGY

## 2020-04-10 PROCEDURE — 88307 TISSUE EXAM BY PATHOLOGIST: CPT | Mod: 26 | Performed by: STUDENT IN AN ORGANIZED HEALTH CARE EDUCATION/TRAINING PROGRAM

## 2020-04-10 PROCEDURE — 25000125 ZZHC RX 250

## 2020-04-10 PROCEDURE — 25000132 ZZH RX MED GY IP 250 OP 250 PS 637: Performed by: STUDENT IN AN ORGANIZED HEALTH CARE EDUCATION/TRAINING PROGRAM

## 2020-04-10 PROCEDURE — 25000128 H RX IP 250 OP 636

## 2020-04-10 PROCEDURE — 3E0R3BZ INTRODUCTION OF ANESTHETIC AGENT INTO SPINAL CANAL, PERCUTANEOUS APPROACH: ICD-10-PCS | Performed by: STUDENT IN AN ORGANIZED HEALTH CARE EDUCATION/TRAINING PROGRAM

## 2020-04-10 PROCEDURE — 12000001 ZZH R&B MED SURG/OB UMMC

## 2020-04-10 RX ORDER — FENTANYL/BUPIVACAINE/NS/PF 2-1250MCG
PLASTIC BAG, INJECTION (ML) INJECTION
Status: COMPLETED
Start: 2020-04-10 | End: 2020-04-10

## 2020-04-10 RX ORDER — IBUPROFEN 800 MG/1
800 TABLET, FILM COATED ORAL ONCE
Status: COMPLETED | OUTPATIENT
Start: 2020-04-10 | End: 2020-04-10

## 2020-04-10 RX ORDER — NALOXONE HYDROCHLORIDE 0.4 MG/ML
.1-.4 INJECTION, SOLUTION INTRAMUSCULAR; INTRAVENOUS; SUBCUTANEOUS
Status: DISCONTINUED | OUTPATIENT
Start: 2020-04-10 | End: 2020-04-10

## 2020-04-10 RX ORDER — OXYTOCIN/0.9 % SODIUM CHLORIDE 30/500 ML
1-24 PLASTIC BAG, INJECTION (ML) INTRAVENOUS CONTINUOUS
Status: DISCONTINUED | OUTPATIENT
Start: 2020-04-10 | End: 2020-04-10

## 2020-04-10 RX ORDER — GENTAMICIN SULFATE 100 MG/100ML
1.7 INJECTION, SOLUTION INTRAVENOUS EVERY 8 HOURS
Status: DISCONTINUED | OUTPATIENT
Start: 2020-04-10 | End: 2020-04-10

## 2020-04-10 RX ORDER — ACETAMINOPHEN 325 MG/1
650 TABLET ORAL EVERY 4 HOURS PRN
Status: DISCONTINUED | OUTPATIENT
Start: 2020-04-10 | End: 2020-04-10

## 2020-04-10 RX ORDER — EPHEDRINE SULFATE 50 MG/ML
INJECTION, SOLUTION INTRAMUSCULAR; INTRAVENOUS; SUBCUTANEOUS
Status: DISCONTINUED
Start: 2020-04-10 | End: 2020-04-10 | Stop reason: HOSPADM

## 2020-04-10 RX ORDER — CALCIUM GLUCONATE 94 MG/ML
1 INJECTION, SOLUTION INTRAVENOUS
Status: DISCONTINUED | OUTPATIENT
Start: 2020-04-10 | End: 2020-04-10

## 2020-04-10 RX ORDER — MISOPROSTOL 200 UG/1
800 TABLET ORAL
Status: DISCONTINUED | OUTPATIENT
Start: 2020-04-10 | End: 2020-04-11 | Stop reason: HOSPADM

## 2020-04-10 RX ORDER — FENTANYL/BUPIVACAINE/NS/PF 2-1250MCG
PLASTIC BAG, INJECTION (ML) INJECTION CONTINUOUS PRN
OUTPATIENT
Start: 2020-04-10

## 2020-04-10 RX ORDER — OXYTOCIN/0.9 % SODIUM CHLORIDE 30/500 ML
340 PLASTIC BAG, INJECTION (ML) INTRAVENOUS CONTINUOUS PRN
Status: DISCONTINUED | OUTPATIENT
Start: 2020-04-10 | End: 2020-04-11 | Stop reason: HOSPADM

## 2020-04-10 RX ORDER — MAGNESIUM SULFATE IN WATER 40 MG/ML
2 INJECTION, SOLUTION INTRAVENOUS CONTINUOUS
Status: DISCONTINUED | OUTPATIENT
Start: 2020-04-10 | End: 2020-04-10

## 2020-04-10 RX ORDER — BISACODYL 10 MG
10 SUPPOSITORY, RECTAL RECTAL DAILY PRN
Status: DISCONTINUED | OUTPATIENT
Start: 2020-04-12 | End: 2020-04-11 | Stop reason: HOSPADM

## 2020-04-10 RX ORDER — SODIUM CHLORIDE, SODIUM LACTATE, POTASSIUM CHLORIDE, CALCIUM CHLORIDE 600; 310; 30; 20 MG/100ML; MG/100ML; MG/100ML; MG/100ML
INJECTION, SOLUTION INTRAVENOUS CONTINUOUS
Status: DISCONTINUED | OUTPATIENT
Start: 2020-04-10 | End: 2020-04-10

## 2020-04-10 RX ORDER — NALOXONE HYDROCHLORIDE 0.4 MG/ML
.1-.4 INJECTION, SOLUTION INTRAMUSCULAR; INTRAVENOUS; SUBCUTANEOUS
Status: DISCONTINUED | OUTPATIENT
Start: 2020-04-10 | End: 2020-04-11 | Stop reason: HOSPADM

## 2020-04-10 RX ORDER — LIDOCAINE 40 MG/G
CREAM TOPICAL
Status: DISCONTINUED | OUTPATIENT
Start: 2020-04-10 | End: 2020-04-10

## 2020-04-10 RX ORDER — ACETAMINOPHEN 325 MG/1
650 TABLET ORAL EVERY 4 HOURS PRN
Status: DISCONTINUED | OUTPATIENT
Start: 2020-04-10 | End: 2020-04-11 | Stop reason: HOSPADM

## 2020-04-10 RX ORDER — LIDOCAINE HYDROCHLORIDE 10 MG/ML
INJECTION, SOLUTION EPIDURAL; INFILTRATION; INTRACAUDAL; PERINEURAL
Status: DISCONTINUED
Start: 2020-04-10 | End: 2020-04-10 | Stop reason: WASHOUT

## 2020-04-10 RX ORDER — OXYTOCIN 10 [USP'U]/ML
10 INJECTION, SOLUTION INTRAMUSCULAR; INTRAVENOUS
Status: DISCONTINUED | OUTPATIENT
Start: 2020-04-10 | End: 2020-04-11 | Stop reason: HOSPADM

## 2020-04-10 RX ORDER — OXYTOCIN/0.9 % SODIUM CHLORIDE 30/500 ML
PLASTIC BAG, INJECTION (ML) INTRAVENOUS
Status: COMPLETED
Start: 2020-04-10 | End: 2020-04-10

## 2020-04-10 RX ORDER — MISOPROSTOL 200 UG/1
TABLET ORAL
Status: COMPLETED
Start: 2020-04-10 | End: 2020-04-10

## 2020-04-10 RX ORDER — FENTANYL CITRATE 50 UG/ML
INJECTION, SOLUTION INTRAMUSCULAR; INTRAVENOUS PRN
OUTPATIENT
Start: 2020-04-10

## 2020-04-10 RX ORDER — OXYTOCIN 10 [USP'U]/ML
INJECTION, SOLUTION INTRAMUSCULAR; INTRAVENOUS
Status: DISCONTINUED
Start: 2020-04-10 | End: 2020-04-10 | Stop reason: WASHOUT

## 2020-04-10 RX ORDER — HYDROCORTISONE 2.5 %
CREAM (GRAM) TOPICAL 3 TIMES DAILY PRN
Status: DISCONTINUED | OUTPATIENT
Start: 2020-04-10 | End: 2020-04-11 | Stop reason: HOSPADM

## 2020-04-10 RX ORDER — AMPICILLIN 2 G/1
2 INJECTION, POWDER, FOR SOLUTION INTRAVENOUS EVERY 6 HOURS
Status: DISCONTINUED | OUTPATIENT
Start: 2020-04-10 | End: 2020-04-10

## 2020-04-10 RX ORDER — AMOXICILLIN 250 MG
1 CAPSULE ORAL 2 TIMES DAILY
Status: DISCONTINUED | OUTPATIENT
Start: 2020-04-10 | End: 2020-04-11 | Stop reason: HOSPADM

## 2020-04-10 RX ORDER — OXYTOCIN/0.9 % SODIUM CHLORIDE 30/500 ML
100-340 PLASTIC BAG, INJECTION (ML) INTRAVENOUS CONTINUOUS PRN
Status: COMPLETED | OUTPATIENT
Start: 2020-04-10 | End: 2020-04-10

## 2020-04-10 RX ORDER — LIDOCAINE HYDROCHLORIDE AND EPINEPHRINE 15; 5 MG/ML; UG/ML
INJECTION, SOLUTION EPIDURAL PRN
OUTPATIENT
Start: 2020-04-10

## 2020-04-10 RX ORDER — IBUPROFEN 800 MG/1
800 TABLET, FILM COATED ORAL EVERY 6 HOURS PRN
Status: DISCONTINUED | OUTPATIENT
Start: 2020-04-10 | End: 2020-04-11 | Stop reason: HOSPADM

## 2020-04-10 RX ORDER — SODIUM CHLORIDE, SODIUM LACTATE, POTASSIUM CHLORIDE, CALCIUM CHLORIDE 600; 310; 30; 20 MG/100ML; MG/100ML; MG/100ML; MG/100ML
INJECTION, SOLUTION INTRAVENOUS
Status: DISCONTINUED
Start: 2020-04-10 | End: 2020-04-10 | Stop reason: HOSPADM

## 2020-04-10 RX ORDER — MODIFIED LANOLIN
OINTMENT (GRAM) TOPICAL
Status: DISCONTINUED | OUTPATIENT
Start: 2020-04-10 | End: 2020-04-11 | Stop reason: HOSPADM

## 2020-04-10 RX ORDER — EPHEDRINE SULFATE 50 MG/ML
5 INJECTION, SOLUTION INTRAMUSCULAR; INTRAVENOUS; SUBCUTANEOUS
Status: DISCONTINUED | OUTPATIENT
Start: 2020-04-10 | End: 2020-04-10

## 2020-04-10 RX ORDER — NALBUPHINE HYDROCHLORIDE 20 MG/ML
2.5-5 INJECTION, SOLUTION INTRAMUSCULAR; INTRAVENOUS; SUBCUTANEOUS EVERY 6 HOURS PRN
Status: DISCONTINUED | OUTPATIENT
Start: 2020-04-10 | End: 2020-04-10

## 2020-04-10 RX ORDER — OXYTOCIN/0.9 % SODIUM CHLORIDE 30/500 ML
100 PLASTIC BAG, INJECTION (ML) INTRAVENOUS CONTINUOUS
Status: DISCONTINUED | OUTPATIENT
Start: 2020-04-10 | End: 2020-04-11 | Stop reason: HOSPADM

## 2020-04-10 RX ORDER — AMOXICILLIN 250 MG
2 CAPSULE ORAL 2 TIMES DAILY
Status: DISCONTINUED | OUTPATIENT
Start: 2020-04-10 | End: 2020-04-11 | Stop reason: HOSPADM

## 2020-04-10 RX ADMIN — Medication: at 08:03

## 2020-04-10 RX ADMIN — FENTANYL CITRATE 14 MCG: 50 INJECTION, SOLUTION INTRAMUSCULAR; INTRAVENOUS at 09:01

## 2020-04-10 RX ADMIN — MAGNESIUM SULFATE HEPTAHYDRATE 2 G/HR: 40 INJECTION, SOLUTION INTRAVENOUS at 08:42

## 2020-04-10 RX ADMIN — Medication 340 ML/HR: at 13:30

## 2020-04-10 RX ADMIN — MISOPROSTOL 400 MCG: 200 TABLET ORAL at 13:29

## 2020-04-10 RX ADMIN — Medication: at 09:04

## 2020-04-10 RX ADMIN — Medication 10 ML/HR: at 09:02

## 2020-04-10 RX ADMIN — Medication 7 ML: at 09:01

## 2020-04-10 RX ADMIN — AMPICILLIN SODIUM 2 G: 2 INJECTION, POWDER, FOR SOLUTION INTRAMUSCULAR; INTRAVENOUS at 08:32

## 2020-04-10 RX ADMIN — ACETAMINOPHEN 650 MG: 325 TABLET ORAL at 02:45

## 2020-04-10 RX ADMIN — OXYTOCIN-SODIUM CHLORIDE 0.9% IV SOLN 30 UNIT/500ML 2 MILLI-UNITS/MIN: 30-0.9/5 SOLUTION at 11:30

## 2020-04-10 RX ADMIN — LIDOCAINE HYDROCHLORIDE,EPINEPHRINE BITARTRATE 3 ML: 15; .005 INJECTION, SOLUTION EPIDURAL; INFILTRATION; INTRACAUDAL; PERINEURAL at 08:55

## 2020-04-10 RX ADMIN — GENTAMICIN SULFATE 120 MG: 40 INJECTION, SOLUTION INTRAMUSCULAR; INTRAVENOUS at 09:40

## 2020-04-10 RX ADMIN — IBUPROFEN 800 MG: 800 TABLET, FILM COATED ORAL at 14:51

## 2020-04-10 RX ADMIN — Medication 2 MILLI-UNITS/MIN: at 11:30

## 2020-04-10 NOTE — PROVIDER NOTIFICATION
"Notified Dr. Shetty that patient continues to contract, IV fluid bolus is complete and pt feels worse, states that they \"hurt like hell.\" Dr. Shetty to come in to assess.   "

## 2020-04-10 NOTE — CONSULTS
Called by labor and delivery RN per patient request as parents had further questions regarding the NICU stay. Answered parents questions regarding discharge criteria from the NICU, neurodevelopmental outcomes, rooming in, and delivery room resuscitation and stabilization. Encouraged parents to invite the NICU provider back with any further questions or concerns.     JASS Painter, NNP-BC 4/10/2020 11:47 AM

## 2020-04-10 NOTE — PROGRESS NOTES
Brief OB Progress Note    In to see patient. On SVE complete, +1. FHT Category II with intermittent variable decels, overall reassuring with moderate variability. Plan to start pushing now. Will have NICU for delivery.      Dr. Gavin updated    Vandana Shetty MD PGY2

## 2020-04-10 NOTE — PROGRESS NOTES
Here for cervical exam.  SVE 8/90/0 Contractions spaced out. Due to PPROM MD recommending augment with pitocin, patientt agrees. Pt requesting NICU consult before delivery. NNP will come see patient soon.

## 2020-04-10 NOTE — PLAN OF CARE
Patient laboring with epidural in place. She states she is slightly uncomfortable and would like the PCA paddle. Paddle provided with instructions for its use. She used it for the first time and verbalized understanding of it use. Continue to monitor closely. Expect .

## 2020-04-10 NOTE — PLAN OF CARE
Okay to take patient of FHR monitor per MD.  Patient states back pain and cramping has improved.  Educated patient on s/s of labor and uterine infection.  Patient denies questions or concerns at this time.

## 2020-04-10 NOTE — PROGRESS NOTES
[facility]   Post-partum Note    Name:  Bailee Sanchez  MRN: [id number]    S: Patient doing well this morning.  Pain adequately controlled.  Denies dizziness, chest pain, SOB, nausea or vomiting. Tolerating regular diet without nausea or vomiting.  Ambulating without dizziness.  Spontaneously voiding. Reports passing golf ball sized clots intermittently. Not really having any bleeding in between. Undecided for postpartum contraception. Reports baby is doing well in the NICU, on CPAP. Pumping.    O:   Patient Vitals for the past 6 hrs:   BP Temp Temp src Pulse Heart Rate Resp SpO2   20 0725 129/89 98  F (36.7  C) Oral 77 77 18 100 %     Gen:  Resting comfortably, NAD  CV:  RRR, well perfused  Pulm:  Unlabored breathing on room air  Abd:  Soft, non-tender, non-distended. Fundus at umbilicus, firm and non-tender. No significant bleeding with vigorous fundal massage  Ext:  non-tender, trace edema bilaterally    I/O last 3 completed shifts:  In: 1493 [P.O.:300; I.V.:1193]  Out: 1622 [Urine:1100; Blood:522]    Hgb:   Hemoglobin   Date Value Ref Range Status   2020 11.4 (L) 11.7 - 15.7 g/dL Final       Assessment/Plan:  29 year old  on PPD #1 s/p  at 28w0d in the setting of PPROM at 27w3d and spontaneous labor with concern for Triple I. Doing well in the early postpartum period.    #Routine postpartum cares  -PNC:   Rh pos. Rubella immune. No intervention indicated.  -Pain: Well-controlled with ibuprofen and tylenol  -Hgb: 11.2> from delivery (received miso)> 11.4 this AM. Patient with passage of some clots post-partum but overall no significant blood loss (additional EBL of 150cc overnight). Given that fundus is firm with no ongoing bleeding with fundal massage and stable hemoglobin will continue to monitor.   -GI Tolerating regular diet. Prn stool softener, anti-emetics  - Voiding spontaneously   -Feed: Pumping for baby in NICU  -BC: Undcided    #Suspected Triple I  -S/p  intrapartum antibiotics, no evidence of ongoing infection    #Depression  -Continue home Lexapro  -S/p  psych consult. Patient follows with [facility] for mental health and plans to keep previously scheduled telehealth visits.    Dispo: DC to home likely PPD#2    Vandana Shetty MD  Ob/Gyn PGY-2  2020 8:26 AM    OBGYN Attending Addendum     Ms. Bailee Sanchez was seen and examined by me separately from the team.  I have reviewed and agree with the above note by Dr. Shetty.    I personally reviewed the following: vitals, meds, labs.     Plan discharge home today. Bailee Sanchez and I discussed the following: signs and symptoms of postpartum depression, signs and symptoms of infection, signs and symptoms of heavy vaginal bleeding. Recommended a 2 week mood check by phone at the [facility] clinic, and a 6 week postpartum visit. Questions answered.    Albina Keene MD, MSCI  Date of Service: 2020
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
## 2020-04-10 NOTE — PROVIDER NOTIFICATION
04/10/20 0222   Provider Notification   Provider Name/Title Wale   Method of Notification In Department   Notification Reason Status Update     Patient called out complaining of constant back pain and uterine pain.  Patient states being awake since 0200 feeling this pain.  Fluid still clear with no bleeding.  Temperature WNL.  FHR monitors applied.  Will continue to monitor.

## 2020-04-10 NOTE — PROVIDER NOTIFICATION
04/10/20 0640   Provider Notification   Provider Name/Title Wale   Method of Notification Electronic Page   Notification Reason Uterine Activity;Pain     Patient complaining of back and uterine pain that comes and goes.  Temp WNL.  Monitors applied.

## 2020-04-10 NOTE — PLAN OF CARE
Data: Afebrile. Leaking scant  amounts of clear fluid. Contraction pattern stable and within parameters. Fetal assessment Appropriate for Gestational Age. Signs and symptoms of infection absent.  Interventions: Monitor vital signs and indicators of infection every 4 hours while awake. Continue uterine/fetal assessment TID and PRN. Activity level: up ad bo. Preventive measures include Positioning and Frequent voiding. Encourage active range of motion and frequent position changes.  Plan: Continue expectant management. Observe for and notify care provider of indicators of progressing labor, signs/symptoms of infection, or fetal/maternal compromise.    Baby having variable decelerations during monitoring, one hour extended monitoring per Dr. Echevarria.  Tracing reviewed by Dr. Echevarria; okay to take off.

## 2020-04-10 NOTE — PROVIDER NOTIFICATION
Pt felt some pressure, MD at [facility], here for SVE, Complete, trial push once with good effort, will set up for delivery, have NICU present.

## 2020-04-10 NOTE — L&D DELIVERY NOTE
OB Vaginal Delivery Note    Bailee Sanchez MRN# [id number]   Age: 29 year old YOB: 1990       GA: 28w0d  GP:   Labor Complications: PPROM, Cervical insufficiency s/p cerclage  Delivery QBL:  145  Delivery Type: Vaginal, Spontaneous   ROM to Delivery Time: (Delivered) Days: 3 Hours: 19 Minutes: 55   Weight:     1 Minute 5 Minute 10 Minute   Apgar Totals: 3   8   8     PAUL TUBBS [facility]     Delivery Details:   Bailee Sanchez is a 29 year old now  who was admitted at 27w3d for PPROM. Patient completed BMZ course upon admission and received 12 hours of magnesium. She was started on latency antibiotics. She underwent daily fetal monitoring and had weekly BPP. Her cerclage was removed on HD#2. On HD#5, at 28w0d she reported increasing abdominal pain and was found to be donal with cervix dilated to 5cm. She had exquisite tenderness to palpation on exam and was started on IV ampicillin/gentamicin due to high concern for Triple I. She was afebrile. She was GBS positive and received the aforementioned antibiotics.  She was restarted on IV magnesium for fetal neuroprotection. She received an epidural for pain control. She progressed to 8cm when her contractions spaced out and her labor was augmented with pitocin in the setting of suspected Triple I. She progressed to complete, pushed with excellent maternal effort, and delivered a liveborn male infant from OA position at 1325 on 4/10/2020. No nuchal cord. APGARs 3, 8, and 8 at 1, 5, and 10 minutes. Weight 3 pounds. Cord allowed to pulse for 1 minute, then clamped and cut. IV pitocin was started. Placenta delivered spontaneously with gentle cord traction and suprapubic countertraction; intact 3V cord, placenta intact.  Examination of perineum revealed no lacerations.  . Additional uterotonics given: misoprostol 400mcg buccal. Fundus firm and perineum hemostatic.  Dr. Gavin present for delivery.     Paul Tubbs,  MD PGY2    I was scrubbed and present for the above uncomplicated  and delivery of placenta.  I ave reviewed and edtied the above note.    Tracy Gavin MD, FACOG             Labor Event Times    Labor onset date:  4/10/20 Onset time:   2:00 AM   Dilation complete date:  4/10/20 Complete time:   1:00 PM   Start pushing date/time:  4/10/2020 1315      Labor Events     labor?:  Yes   steroids:  Full Course  Labor Type:  Spontaneous  Predominate monitoring during 1st stage:  continuous electronic fetal monitoring     Antibiotics received during labor?:  Yes  Reason for Antibiotics:  GBS  Antibiotics received for GBS:  Other     Rupture date/time: 20 1730   Rupture type:  Prolonged Premature Rupture of Membranes  Fluid color:  Clear, Other (comment)  Fluid odor:  Normal     1:1 continuous labor support provided by?:  RN Labor partogram used?:  no      Delivery/Placenta Date and Time    Delivery Date:  4/10/20 Delivery Time:   1:25 PM   Placenta Date/Time:  4/10/2020  1:30 PM  Oxytocin given at the time of delivery:  after delivery of baby     Vaginal Counts     Initial count performed by 2 team members:   Two Team Members   JELLY Shetty MD       [place] [employer]   Initial counts 2 0 5    Added to count 0 0 0    Final counts 2 0 5    Placed during labor Accounted for at the end of labor   NA NA   NA NA   NA NA    Final count performed by 2 team members:   Two Team Members   JELLY Shetty MD      Final count correct?:  Yes     Apgars    Living status:  Living   1 Minute 5 Minute 10 Minute 15 Minute 20 Minute   Skin color: 0  1  1      Heart rate: 1  2  2      Reflex irritability: 1  2  2      Muscle tone: 1  1  1      Respiratory effort: 0  2  2      Total: 3  8  8      Apgars assigned by:  JASS JALLOH,NNP-BC     Cord    Vessels:  3 Vessels Complications:  None   Cord Blood Disposition:  Lab Gases Sent?:  No      [place] Resuscitation     Methods:  Temp Skin Control, Polyethylene Bag, Temp Skin Probe, Oxygen, Transwarmer, Judah Puff, Oximetry   Care at Delivery:  Invited to attend this vaginal delivery by Dr. Tracy Gavin for this premature infant born at 28w0d. Infant was delivered with tone and grimace, he was placed on mother's abdomen and dried and stimulated. Continued to have some tone and grimace with stimulation. Umbilical cord was clamped and cut around 50 seconds of age. Infant was brought to pre-warmed and placed inside polyethylene bag. He was stimulated and mask CPAP +5 started immediately. PPV PIP 25 PEEP 5 FiO2 30% started due to apnea at 75 seconds of life. HR remained 60-70 despite PPV. FiO2 increased to 100%. Oropharynx was suction and mask and head and neck was repositioned. Resumed PPV, HR rising. Infant with regular respiratory effort by 3 minutes of age, PPV discontinued and infant transitioned to mask CPAP. FiO2 incrementally weaned to 21%. Infant remained on mask CPAP. He was wrapped with warm blankets and shown to parents. Parents updated. Infant transferred to the NICU for critical care management.     JASS Painter, NNP-BC 4/10/2020 2:16 PM         Skin to Skin and Feeding Plan    Skin to skin initiation date/time: 1841    Skin to skin end date/time: 1841    How do you plan to feed your baby:  Breastfeeding     Labor Events and Shoulder Dystocia    Fetal Tracing Prior to Delivery:  Category 2     Delivery (Maternal) (Provider to Complete) ([id number])    Episiotomy:  None  Perineal lacerations:  None       Blood Loss  Mother: Daniel Bailee #[id number]   Start of Mother's Information    IO Blood Loss  04/10/20 0200 - 04/10/20 1426    Delivery QBL (mL) Hospital Encounter 145 mL    Total  145 mL         End of Mother's Information  Mother: Daniel Bailee #[id number]         Delivery - Provider to Complete ([id number])    Delivering clinician:  Tracy Gavin MD  Attempted Delivery Types (Choose all that  apply):  Spontaneous Vaginal Delivery  Delivery Type (Choose the 1 that will go to the Birth History):  Vaginal, Spontaneous   Other personnel:   Provider Role   Vandana Shetty MD Resident French, Rebecca Ruth Shobe, MD MD         Placenta    Delayed Cord Clamping:  Done  Date/Time:  4/10/2020  1:30 PM  Removal:  Spontaneous  Disposition:  Pathology     Anesthesia    Method:  Epidural  Cervical dilation at placement:  4-7   Analgesic:   BIRTH HISTORY: ANALGESIC   BUPIVACAINE HCL IJ   FENTANYL (BULK CHEMICALS - F'S)         Presentation and Position    Presentation:  Vertex   Occiput Anterior           Vandana Shetty MD

## 2020-04-10 NOTE — PROGRESS NOTES
[facility]  Labor Progress Note    Subjective:  Doing well. Agreeable to cervical check    Objective:   Patient Vitals for the past 4 hrs:   BP Temp Temp src Heart Rate Resp SpO2   04/10/20 1039 114/56 -- -- -- 20 98 %   04/10/20 1000 110/57 -- -- -- 20 98 %   04/10/20 0938 111/56 -- -- -- 20 96 %   04/10/20 0937 111/56 -- -- -- 20 96 %   04/10/20 0932 112/56 -- -- -- 20 --   04/10/20 0930 -- -- -- -- -- 96 %   04/10/20 0927 114/58 98  F (36.7  C) Oral -- 20 97 %   04/10/20 0922 114/55 -- -- -- 20 96 %   04/10/20 0917 117/55 -- -- -- 20 96 %   04/10/20 0912 114/56 -- -- -- 20 97 %   04/10/20 0906 115/57 -- -- -- 16 95 %   04/10/20 0904 120/57 -- -- -- 18 95 %   04/10/20 0902 115/57 -- -- -- 18 94 %   04/10/20 0900 120/63 -- -- -- 18 94 %   04/10/20 0855 118/63 -- -- -- -- 100 %   04/10/20 0846 136/70 -- -- -- 18 --   04/10/20 0844 119/63 -- -- -- 16 --   04/10/20 0842 123/68 -- -- -- 18 100 %   04/10/20 0840 123/66 -- -- -- 18 --   04/10/20 0838 131/68 -- -- -- 18 100 %   04/10/20 0836 127/68 -- -- -- 18 --   04/10/20 0835 127/66 -- -- -- 16 --   04/10/20 0831 132/71 -- -- -- 20 --   04/10/20 0830 112/68 -- -- -- 20 100 %   04/10/20 0812 125/82 -- -- -- 18 --   04/10/20 0810 133/85 -- -- -- 18 --   04/10/20 0803 120/75 -- -- -- -- --   04/10/20 0735 129/82 98.2  F (36.8  C) Oral -- 18 --     Gen:    Appears comfortable  SVE: deferred    FHT: Baseline 135, moderate variability, pos accelerations, no decelerations  [place]: Not tracing, likely have spaced out    Assessment/Plan:  Ms. Bailee Sanchez is a 29 year old  at 28w0d admitted with PPROM on 2020, now in labor with suspected Triple I.    - Cervix: Now 8cm, contractions have spaced. Will start pitocin now  - Pain management: Epidural  - IV Amp/Gent for suspected Triple I  - Continue magnesium 2g/hr for neuroprotection  - Anticipate   - Continuous EFM and [place]. NICU notified    Vandana Shetty MD  Ob/Gyn PGY-2  April 10, 2020 11:08  AM

## 2020-04-10 NOTE — PLAN OF CARE
Pt was transferred to Labor and delivery at 0810, oriented to room, call light in reach, she requested her epidural was place, now patient is comfortable and dozing. Her  James arrived at 0830.  Nicu awre patient is in labor, room is set for delivery. Continue plan of care.

## 2020-04-10 NOTE — PROGRESS NOTES
MFM Progress Note    Called to see patient for cramping, increased pain      S: Patient states pain hurts like hell. Started around 2am but is getting worse.      O:  Patient Vitals for the past 6 hrs:   Temp Temp src   04/10/20 0640 98.2  F (36.8  C) Oral   04/10/20 0222 98  F (36.7  C) Oral   Gen: Alert, appears uncomfortable  CV: Well perfused  Abd: Exquisitely tender to palpation over abdomen  SVE: /-2    FHT:  BL: 130, moderate variability, +accels, no decels  [place]: 2-3 in 10        A/P  Bailee Sanchez is a 29 year old  at 28w0d admitted now HD#5 with PPROM, now in labor with high suspicion for Triple I based on exquisite abdominal tenderness. VS currently wnl, FHT reassuring.    -Move to L&D  -IV ampicillin/gentamicin ordered STAT  -6g mag load followed by 2g/hr  -s/p BMZ course -  -Will notify NICU  -Given suspected Triple I patient will need augmentation if does not spontaneously deliver  -Continuous monitoring  -Pain control per patient wishes      Patient seen with Dr. Gao. Dr. Gavin updated    Vandana Shetty MD PGY-2    Physician Attestation   I, Camron Gao MD, saw this patient with the resident and agree with the resident/fellow's findings and plan of care as documented in the note.      I personally reviewed vital signs, medications, labs, imaging and EFM.    Key findings: In summary, Bailee Sanchez is a  at 28w0d admitted with PPROM, now HD#5.  Her clinical picture is most consistent with  labor with suspected chorioamnionitis.  Will begin broad spectrum antibiotics and would augment labor if needed.   Patient is GBS positive as well, which will be covered with IV ampicillin for suspected chorioamnionitis.  Patient to L&D now and she is in agreement with this plan.     Camron Gao MD  Date of Service (when I saw the patient): 04/10/20

## 2020-04-10 NOTE — PROGRESS NOTES
[facility]  Labor Progress Note    Subjective:  Patient doing well. Partner is here now. Comfortable with epidural.    Objective:   Patient Vitals for the past 4 hrs:   BP Temp Temp src Heart Rate Resp SpO2   04/10/20 0938 111/56 -- -- -- 20 96 %   04/10/20 0937 111/56 -- -- -- 20 96 %   04/10/20 0932 112/56 -- -- -- 20 --   04/10/20 0930 -- -- -- -- -- 96 %   04/10/20 0927 114/58 98  F (36.7  C) Oral -- 20 97 %   04/10/20 0922 114/55 -- -- -- 20 96 %   04/10/20 0917 117/55 -- -- -- 20 96 %   04/10/20 0912 114/56 -- -- -- 20 97 %   04/10/20 0906 115/57 -- -- -- 16 95 %   04/10/20 0904 120/57 -- -- -- 18 95 %   04/10/20 0902 115/57 -- -- -- 18 94 %   04/10/20 0900 120/63 -- -- -- 18 94 %   04/10/20 0855 118/63 -- -- -- -- 100 %   04/10/20 0846 136/70 -- -- -- 18 --   04/10/20 0844 119/63 -- -- -- 16 --   04/10/20 0842 123/68 -- -- -- 18 100 %   04/10/20 0840 123/66 -- -- -- 18 --   04/10/20 0838 131/68 -- -- -- 18 100 %   04/10/20 0836 127/68 -- -- -- 18 --   04/10/20 0835 127/66 -- -- -- 16 --   04/10/20 0831 132/71 -- -- -- 20 --   04/10/20 0830 112/68 -- -- -- 20 100 %   04/10/20 0812 125/82 -- -- -- 18 --   04/10/20 0810 133/85 -- -- -- 18 --   04/10/20 0803 120/75 -- -- -- -- --   04/10/20 0640 -- 98.2  F (36.8  C) Oral -- -- --     Gen:    Appears comfortable  SVE: deferred    FHT: Baseline 135, moderate variability, pos accelerations, no decelerations  [place]: 2 contractions in 10 minutes    Assessment/Plan:  Ms. Bailee Sanchez is a 29 year old  at 28w0d admitted with PPROM on 2020, now in labor with suspected Triple I.    - Cervix: Plan SVE at 12. If no change will start pitocin for augmentation  - Pain management: Epidural  - IV Amp/Gent for suspected Triple I  - Continue magnesium 2g/hr for neuroprotection  - Anticipate   - Continuous EFM and [place]. NICU notified    Vandana Shetty MD  Ob/Gyn PGY-2  April 10, 2020 10:11 AM

## 2020-04-10 NOTE — PLAN OF CARE
Patient arrived to [facility] unit via wheelchair at 1640,with belongings, accompanied by spouse/ significant other, with infant in NICU. Received report from YUE Cummings . Unit and room orientation completd. Call light given; no concerns present at this time. Continue with plan of care.

## 2020-04-10 NOTE — PLAN OF CARE
Data: Bailee Sanchez transferred to 7142 via wheelchair at 1550.  in NICU, we visited the  for 20 minutes prior to going to postpartum room.  Utilized the [employer] Steady to assist patient to bathroom. Left leg weight bearing but unable to walk.  Action: Receiving unit notified of transfer: Yes. Patient and family notified of room change. Report given to Nilay Pedraza Rn  at 1615. Belongings sent to receiving unit. Accompanied by Registered Nurse. Oriented patient to surroundings. Call light within reach. ID bands double-checked with receiving RN.  Response: Patient tolerated transfer and is stable.

## 2020-04-10 NOTE — ANESTHESIA PROCEDURE NOTES
Epidural Procedure Note  Staff -   Anesthesiologist:  Lisha Walter MD  Resident/Fellow: Tessa Callahan MD    Performed By: resident          Location: OB     Procedure start time:  4/10/2020 8:40 AM     Procedure end time:  4/10/2020 9:03 AM   Pre-procedure checklist:   patient identified, IV checked, site marked, risks and benefits discussed, informed consent, monitors and equipment checked, pre-op evaluation, at physician/surgeon's request and post-op pain management      Correct Patient: Yes      Correct Position: Yes      Correct Site: Yes      Correct Procedure: Yes      Correct Laterality:  Yes    Site Marked:  Yes  Procedure:     Procedure:  Epidural catheter    ASA:  2    Position:  Sitting    Sterile Prep: chloraprep      Insertion site:  L3-4    Local skin infiltration:  1% lidocaine    amount (mL):  3    Approach:  Midline    Needle gauge (G):  17    Needle Length (in):  3.5    Block Needle Type:  Touhy    Injection Technique:  LORT saline    RICARDO at (cm):  8.5    Attempts:  2    Redirects:  3    Catheter gauge (G):  19    Catheter threaded easily: Yes      Threaded to cm at skin:  13.5    Threaded in epidural space (cm):  5    Paresthesias:  No    Aspiration negative for Heme or CSF: Yes      Test dose (mL):  3     Local anesthetic:  Lidocaine 1.5% w/ 1:200,000 epinephrine

## 2020-04-10 NOTE — ANESTHESIA PREPROCEDURE EVALUATION
"Anesthesia Pre-Procedure Evaluation    Patient: Bailee Sanchez   MRN:     [id number] Gender:   female   Age:    29 year old :      1990        Preoperative Diagnosis: * No surgery found *        LABS:  CBC:   Lab Results   Component Value Date    WBC 13.0 (H) 2020    WBC 11.9 (H) 2020    HGB 11.2 (L) 2020    HGB 11.5 (L) 2020    HCT 33.0 (L) 2020    HCT 34.5 (L) 2020     2020     2020     BMP: No results found for: NA, POTASSIUM, CHLORIDE, CO2, BUN, CR, GLC  COAGS: No results found for: PTT, INR, FIBR  POC:   Lab Results   Component Value Date    BGM 80 2020    HCG Negative 2019     OTHER: No results found for: PH, LACT, A1C, YUMIKO, PHOS, MAG, ALBUMIN, PROTTOTAL, ALT, AST, GGT, ALKPHOS, BILITOTAL, BILIDIRECT, LIPASE, AMYLASE, LESTER, TSH, T4, T3, CRP, SED     Preop Vitals    BP Readings from Last 3 Encounters:   20 111/57   20 115/74   20 125/80    Pulse Readings from Last 3 Encounters:   20 93   20 80   20 88      Resp Readings from Last 3 Encounters:   20 16   20 18   20 16    SpO2 Readings from Last 3 Encounters:   20 99%   20 97%   20 98%      Temp Readings from Last 1 Encounters:   04/10/20 36.8  C (98.2  F) (Oral)    Ht Readings from Last 1 Encounters:   20 1.549 m (5' 1\")      Wt Readings from Last 1 Encounters:   20 79.4 kg (175 lb)    Estimated body mass index is 33.07 kg/m  as calculated from the following:    Height as of 20: 1.549 m (5' 1\").    Weight as of 20: 79.4 kg (175 lb).     LDA:  Peripheral IV 20 Left Hand (Active)   Site Assessment WDL 04/10/20 0222   Line Status Saline locked 04/10/20 0222   Phlebitis Scale 0-->no symptoms 04/10/20 0222   Infiltration Scale 0 04/10/20 0222   Dressing Intervention New dressing  20 0841   Number of days: 4        Past Medical History:   Diagnosis Date     Depressive disorder      Gout  "     HSV-1 infection      Hypertension       Past Surgical History:   Procedure Laterality Date     CERCLAGE CERVICAL N/A 2020    Procedure: CERCLAGE, CERVIX, VAGINAL APPROACH;  Surgeon: Kaila Ramesh MD;  Location:  L+D     HEAD & NECK SURGERY      WISDOM TEETH EXTRACTION     LE FORT ONE , OSTEOTOMY SAGITTAL SPLIT, COMBINED N/A 2019    Procedure: COMBINED LE FORT ONE, OSTEOTOMY SAGITTAL SPLIT;  Surgeon: Prashanth Dumont DDS;  Location: [facility] OR      No Known Allergies     Anesthesia Evaluation       history and physical reviewed .             ROS/MED HX    ENT/Pulmonary:  - neg pulmonary ROS     Neurologic:  - neg neurologic ROS     Cardiovascular: Comment: HTN        METS/Exercise Tolerance:     Hematologic:         Musculoskeletal:         GI/Hepatic:  - neg GI/hepatic ROS       Renal/Genitourinary:         Endo:         Psychiatric:         Infectious Disease:         Malignancy:         Other:                     JZG FV AN PHYSICAL EXAM    Assessment:   ASA SCORE: 2            PONV Management: Adult Risk Factors: Female                   29  @28w PPROM, cervical insufficiency s/p cerclage and removal.     Other significant hx:  Depression       Tessa Callahan MD

## 2020-04-11 VITALS
DIASTOLIC BLOOD PRESSURE: 89 MMHG | HEART RATE: 77 BPM | SYSTOLIC BLOOD PRESSURE: 129 MMHG | RESPIRATION RATE: 18 BRPM | OXYGEN SATURATION: 100 % | TEMPERATURE: 98 F

## 2020-04-11 LAB
BACTERIA SPEC CULT: ABNORMAL
HGB BLD-MCNC: 11.4 G/DL (ref 11.7–15.7)
SPECIMEN SOURCE: ABNORMAL

## 2020-04-11 PROCEDURE — 25000132 ZZH RX MED GY IP 250 OP 250 PS 637: Performed by: STUDENT IN AN ORGANIZED HEALTH CARE EDUCATION/TRAINING PROGRAM

## 2020-04-11 PROCEDURE — 25000128 H RX IP 250 OP 636: Performed by: OBSTETRICS & GYNECOLOGY

## 2020-04-11 PROCEDURE — 36415 COLL VENOUS BLD VENIPUNCTURE: CPT | Performed by: STUDENT IN AN ORGANIZED HEALTH CARE EDUCATION/TRAINING PROGRAM

## 2020-04-11 PROCEDURE — 85018 HEMOGLOBIN: CPT | Performed by: STUDENT IN AN ORGANIZED HEALTH CARE EDUCATION/TRAINING PROGRAM

## 2020-04-11 PROCEDURE — 90715 TDAP VACCINE 7 YRS/> IM: CPT | Performed by: OBSTETRICS & GYNECOLOGY

## 2020-04-11 RX ORDER — ESCITALOPRAM OXALATE 10 MG/1
10 TABLET ORAL AT BEDTIME
Status: DISCONTINUED | OUTPATIENT
Start: 2020-04-11 | End: 2020-04-11 | Stop reason: HOSPADM

## 2020-04-11 RX ORDER — AMOXICILLIN 250 MG
1 CAPSULE ORAL 2 TIMES DAILY
Qty: 60 TABLET | Refills: 1 | Status: SHIPPED | OUTPATIENT
Start: 2020-04-11

## 2020-04-11 RX ORDER — ACETAMINOPHEN 325 MG/1
650 TABLET ORAL EVERY 4 HOURS PRN
Qty: 60 TABLET | Refills: 1 | Status: SHIPPED | OUTPATIENT
Start: 2020-04-11 | End: 2020-05-11

## 2020-04-11 RX ORDER — IBUPROFEN 800 MG/1
800 TABLET, FILM COATED ORAL EVERY 8 HOURS PRN
Qty: 60 TABLET | Refills: 1 | Status: SHIPPED | OUTPATIENT
Start: 2020-04-11

## 2020-04-11 RX ADMIN — CLOSTRIDIUM TETANI TOXOID ANTIGEN (FORMALDEHYDE INACTIVATED), CORYNEBACTERIUM DIPHTHERIAE TOXOID ANTIGEN (FORMALDEHYDE INACTIVATED), BORDETELLA PERTUSSIS TOXOID ANTIGEN (GLUTARALDEHYDE INACTIVATED), BORDETELLA PERTUSSIS FILAMENTOUS HEMAGGLUTININ ANTIGEN (FORMALDEHYDE INACTIVATED), BORDETELLA PERTUSSIS PERTACTIN ANTIGEN, AND BORDETELLA PERTUSSIS FIMBRIAE 2/3 ANTIGEN 0.5 ML: 5; 2; 2.5; 5; 3; 5 INJECTION, SUSPENSION INTRAMUSCULAR at 15:08

## 2020-04-11 RX ADMIN — ESCITALOPRAM OXALATE 10 MG: 10 TABLET ORAL at 03:05

## 2020-04-11 NOTE — CONSULTS
"[facility]  MATERNAL CHILD HEALTH   SOCIAL WORK PROGRESS NOTE      DATA:     On Call SW received page. Pt scored 11 on EDS, discharging today.   Pt's infant born at 28 weeks and is being cared for in [facility] NICU. Primary SW, Shelia Coelho, will continue to follow for support throughout infant's hospitalization.     SW spoke to pt, Bailee, over the phone while she was in her [employer] [employer] room. Bailee states that she has an appointment to see her psychiatrist next Wed (4/15) and will be meeting with a new psychotherapist the following week. She has recently had a medication change from Zoloft to Lexapro.    INTERVENTION:       This  reviewed the chart and coordinated with the health care team.     This  introduced myself and my role as on-call SW, colleague to family's primary Maternal-Child Health .    Spoke with pt today to assess for needs, offer support, assess for coping and review hospital and community resources.     Provided supportive counseling related to extended hospitalization and NICU admission.      Validated and normalized expressed emotions.     Provided emotional support and active listening related to NICU admission, coping.    ASSESSMENT:     Bailee receptive to SW supportive check in. She states that she has the support in place that she needs at this time. She identifies the conflicting thoughts of looking forward to going home when she discharges and wishing she could be with her baby. She seems to have insight and sounds intentional in \"taking one day at a time.\" Bailee sounds to be receptive and appreciative to SW support available and community resources as they are appropriate.     PLAN:     Primary SW, Shelia Coelho, will continue to follow for support throughout infant's hospitalization.       Kjerstin Rydeen, [facility]   Social Worker  Maternal Child Health   Direct: [phone]  Pager: [phone]     "

## 2020-04-11 NOTE — PLAN OF CARE
Postpartum stable. Voiding adequate amounts. Showered this evening. PIV SL. Passed 2 golf ball sized clots after her shower, no more since then and pt is aware to notify of any more. Pain is manageable with ibuprofen. Pumping q 3 hours. Visiting  in NICU. EDS score of 11, SW and provider notified.

## 2020-04-11 NOTE — PLAN OF CARE
Data: Vital signs within normal limits. Postpartum checks within normal limits - see flow record. Patient eating and drinking normally. Patient able to empty bladder independently and is up ambulating. No apparent signs of infection.  Perineum  healing well. Patient performing self cares and is visiting infant in NICU.  Action: Patient medicated during the shift for pain and cramping. See MAR. Patient reassessed within 1 hour after each medication and pain was improved - patient stated she was comfortable. Patient education done about discharge instructions and follow up. See flow record.  Response: Positive attachment behaviors observed with infant. Support persons boyfriengalina Ramirez present.   Plan: Anticipate discharge on 4/11 1530.

## 2020-04-11 NOTE — PROVIDER NOTIFICATION
04/11/20 0348   Provider Notification   Provider Name/Title G3   Method of Notification Electronic Page   Request Evaluate-Remote   Notification Reason Status Update   7142 K.Y pt passed clot 36gms pt reports she has passed a few clots about this size but the flushed them previously unable to measure. firm U/1 scant lochia. thank you Chasity TURNER [id number]

## 2020-04-11 NOTE — PLAN OF CARE
VSS and postpartum assessment WDL. She is up ad bo, voiding, pain managed; not currently taking any medications. Perineum appears to be healing well with no s/s infection. Uterus firm and midline. Scant lochia rubra; larger clots (provider notified.) No breast or nipple pain; pumping independently. Passing flatus. No support person present at bedside; patient didn't visit baby in NICU overnight; advised that she could be taken down whenever she would like to go. Education provided on self-care Continue with plan of care.

## 2020-04-11 NOTE — PROGRESS NOTES
Page to G2 Neeraj HENDRICKS, Had additional clot this AM, 32g. Thanks Ana Maria [id number] page to G2  RAMESHKOLE, Now has had two clots this AM: first 32g, now 20g in addition to clots she had last samia and overnight.  Hgb 11.4. Asymptomatic.

## 2020-04-11 NOTE — PROVIDER NOTIFICATION
04/10/20 5333   Provider Notification   Provider Name/Title G3   Method of Notification Electronic Page   Request Evaluate-Remote   Notification Reason Medication Request   Patient's escitalopram is listed as a discontinued med. She takes this around 10 each night. Can that be re-ordered to give tonight

## 2020-04-11 NOTE — PROVIDER NOTIFICATION
"Notified the G3 that the patient passed two \"golf ball\" in her words, sized clots. She flushed before I was able to assess them. Vitals are stable and fundus is firm. Informed pt to keep clots if she passes one again.  "

## 2020-04-14 ENCOUNTER — TELEPHONE (OUTPATIENT)
Dept: OBGYN | Facility: CLINIC | Age: 30
End: 2020-04-14

## 2020-04-14 NOTE — TELEPHONE ENCOUNTER
lvm for patient to call and sched aptBailee had her baby on 4/10/20. She needs a 2 week mood check. OK for phone

## 2020-04-16 ENCOUNTER — LACTATION ENCOUNTER (OUTPATIENT)
Age: 30
End: 2020-04-16

## 2020-04-16 NOTE — LACTATION NOTE
"This note was copied from a baby's chart.  D: Bailee is pumping every 3 hours for 40-50ml/pp, now on blue dots but hasn't been logging. She has blister on one nipple and describes using high suction pressure. She is alternating between 24-27mm flanges.   I: Provided support, encouraged logging today.  I reviewed physiology and treatment of engorgement, and encouraged her to use ice 10-15\" before pumping (today only) and take her pain meds as prescribed. I dispensed hydrogels and instructed her in their use. We discussed pump settings, suction pressure, and I switched her to maintain setting.  A: Mom has treatment plan for symptoms, is pumping for increasing volumes.  P: Will continue to provide lactation support.   Bethany Swanson, RNC, IBCLC        "

## 2020-04-17 LAB — COPATH REPORT: NORMAL

## 2020-05-01 ENCOUNTER — LACTATION ENCOUNTER (OUTPATIENT)
Age: 30
End: 2020-05-01

## 2020-05-01 NOTE — LACTATION NOTE
This note was copied from a baby's chart.  D:  Bailee emailed me Wednesday night that she has not seen progress in her pumping volumes, they remain between 200-300 ml/day (should be 720 ml or more at this point).  I:  I told her she could contact her MD to see whether they would feel comfortable writing orders for labs to check if there is a definable reason she is not increasing.  I told her we usually check Hcg, TSH, prolactin and testosterone levels.  A:  Mom with low milk supply, will see if she can get labs drawn to check for cause.  P:  Will continue to provide lactation support.      Jessica Valadez, RNC, IBCLC

## 2020-05-09 ENCOUNTER — MYC MEDICAL ADVICE (OUTPATIENT)
Dept: OBGYN | Facility: CLINIC | Age: 30
End: 2020-05-09

## 2020-05-12 NOTE — TELEPHONE ENCOUNTER
I think her normal OBGYN should do this, I saw her once.  I honestly have never given that medication for this.

## 2021-01-03 ENCOUNTER — HEALTH MAINTENANCE LETTER (OUTPATIENT)
Age: 31
End: 2021-01-03

## 2021-04-25 ENCOUNTER — HEALTH MAINTENANCE LETTER (OUTPATIENT)
Age: 31
End: 2021-04-25

## 2021-10-10 ENCOUNTER — HEALTH MAINTENANCE LETTER (OUTPATIENT)
Age: 31
End: 2021-10-10

## 2022-05-21 ENCOUNTER — HEALTH MAINTENANCE LETTER (OUTPATIENT)
Age: 32
End: 2022-05-21

## 2022-09-18 ENCOUNTER — HEALTH MAINTENANCE LETTER (OUTPATIENT)
Age: 32
End: 2022-09-18

## 2023-06-04 ENCOUNTER — HEALTH MAINTENANCE LETTER (OUTPATIENT)
Age: 33
End: 2023-06-04

## 2024-12-09 NOTE — PROVIDER NOTIFICATION
Notified the G2 that the EDS score was 11. SW order will be placed. Pt's mood is appropriate at this time, spouse in room for support.   Unknown if ever smoked

## 2025-07-23 ENCOUNTER — LAB REQUISITION (OUTPATIENT)
Dept: LAB | Facility: CLINIC | Age: 35
End: 2025-07-23

## 2025-07-23 LAB
ALBUMIN SERPL BCG-MCNC: 4.7 G/DL (ref 3.5–5.2)
ALP SERPL-CCNC: 57 U/L (ref 40–150)
ALT SERPL W P-5'-P-CCNC: 71 U/L (ref 0–50)
ANION GAP SERPL CALCULATED.3IONS-SCNC: 18 MMOL/L (ref 7–15)
AST SERPL W P-5'-P-CCNC: 48 U/L (ref 0–45)
BILIRUB SERPL-MCNC: 0.4 MG/DL
BUN SERPL-MCNC: 14.2 MG/DL (ref 6–20)
CALCIUM SERPL-MCNC: 9.9 MG/DL (ref 8.8–10.4)
CHLORIDE SERPL-SCNC: 103 MMOL/L (ref 98–107)
CHOLEST SERPL-MCNC: 242 MG/DL
CREAT SERPL-MCNC: 0.74 MG/DL (ref 0.51–0.95)
EGFRCR SERPLBLD CKD-EPI 2021: >90 ML/MIN/1.73M2
FASTING STATUS PATIENT QL REPORTED: YES
FASTING STATUS PATIENT QL REPORTED: YES
GLUCOSE SERPL-MCNC: 74 MG/DL (ref 70–99)
HCO3 SERPL-SCNC: 19 MMOL/L (ref 22–29)
HDLC SERPL-MCNC: 52 MG/DL
HOLD SPECIMEN: NORMAL
LDLC SERPL CALC-MCNC: 159 MG/DL
NONHDLC SERPL-MCNC: 190 MG/DL
POTASSIUM SERPL-SCNC: 4.3 MMOL/L (ref 3.4–5.3)
PROT SERPL-MCNC: 7.5 G/DL (ref 6.4–8.3)
SODIUM SERPL-SCNC: 140 MMOL/L (ref 135–145)
TRIGL SERPL-MCNC: 157 MG/DL
TSH SERPL DL<=0.005 MIU/L-ACNC: 1.58 UIU/ML (ref 0.3–4.2)
URATE SERPL-MCNC: 6.2 MG/DL (ref 2.4–5.7)

## 2025-07-23 PROCEDURE — 84550 ASSAY OF BLOOD/URIC ACID: CPT | Performed by: FAMILY MEDICINE

## 2025-07-23 PROCEDURE — 82247 BILIRUBIN TOTAL: CPT | Performed by: FAMILY MEDICINE

## 2025-07-23 PROCEDURE — 84443 ASSAY THYROID STIM HORMONE: CPT | Performed by: FAMILY MEDICINE

## 2025-07-23 PROCEDURE — 80061 LIPID PANEL: CPT | Performed by: FAMILY MEDICINE

## (undated) DEVICE — TUBING SUCTION 10'X3/16" N510

## (undated) DEVICE — DRILL TWIST STRK 1.35X50MM 5MM STOP 6013505

## (undated) DEVICE — LINEN TOWEL PACK X5 5464

## (undated) DEVICE — BLADE SAW SAGITTAL 25.5X9.5X.4MM FINE LINVATEC 5023-138

## (undated) DEVICE — SOL WATER IRRIG 1000ML BOTTLE 2F7114

## (undated) DEVICE — DRAPE POUCH IRR 1016

## (undated) DEVICE — LUBRICATING JELLY 2.7GM T00137

## (undated) DEVICE — GLOVE PROTEXIS POWDER FREE 8.0 ORTHOPEDIC 2D73ET80

## (undated) DEVICE — PREP POVIDONE IODINE USP 7.5% CLEANING SOL 64538161

## (undated) DEVICE — SPONGE PACK VAGINAL 2X36"

## (undated) DEVICE — GOWN LG DISP 9515

## (undated) DEVICE — DRILL BIT TWIST 2.0X1.6X5MM 60-16005

## (undated) DEVICE — PREP POVIDONE IODINE USP 10% TOPICAL SOL 64537161

## (undated) DEVICE — DRAPE GYN/UROLOGY FLUID POUCH TUR 29455

## (undated) DEVICE — SUCTION TIP YANKAUER W/O VENT K86

## (undated) DEVICE — SU VICRYL 4-0 PS-2 18" UND J496H

## (undated) DEVICE — Device

## (undated) DEVICE — VSP SPLINTS

## (undated) DEVICE — SPONGE RAY-TEC 4X8" 7318

## (undated) DEVICE — SU VICRYL 4-0 PC-3 18" UND J845G

## (undated) DEVICE — SOL WATER IRRIG 1000ML BOTTLE 07139-09

## (undated) DEVICE — GLOVE ESTEEM POWDER FREE SMT 7.0  2D72PT70

## (undated) DEVICE — GLOVE PROTEXIS W/NEU-THERA 6.5  2D73TE65

## (undated) DEVICE — NDL COUNTER 20CT 31142493

## (undated) DEVICE — BUR WIREPASS DRILL LINVATEC MED 1.5X19MM 5091-248

## (undated) DEVICE — DRILL TWIST STRK 1.6MM XLG 9216820

## (undated) DEVICE — SOL NACL 0.9% IRRIG 1000ML BOTTLE 07138-09

## (undated) DEVICE — SURGICEL HEMOSTAT 3X4" NUKNIT 1943

## (undated) DEVICE — BUR OVAL LINVATEC 4X8MM 5091-236

## (undated) DEVICE — SOL NACL 0.9% IRRIG 1000ML BOTTLE 2F7124

## (undated) DEVICE — ESU NDL COLORADO MICRO 3CM STR N103A

## (undated) DEVICE — GOWN IMPERVIOUS SPECIALTY XL/XLONG 39049

## (undated) DEVICE — ESU GROUND PAD UNIVERSAL W/O CORD

## (undated) DEVICE — GLOVE PROTEXIS POWDER FREE 7.5 ORTHOPEDIC 2D73ET75

## (undated) DEVICE — PREP SKIN SCRUB TRAY 4461A

## (undated) DEVICE — SU VICRYL 3-0 PS-1 18" UND J683

## (undated) DEVICE — BUR ROUND 4MM CARBIDE LONG 5092-228

## (undated) DEVICE — GLOVE PROTEXIS W/NEU-THERA 8.5  2D73TE85

## (undated) DEVICE — LIGHT HANDLE X2

## (undated) DEVICE — GLOVE PROTEXIS BLUE W/NEU-THERA 7.0  2D73EB70

## (undated) DEVICE — CATH TRAY FOLEY 16FR BARDEX W/DRAIN BAG STATLOCK 300316A

## (undated) DEVICE — BUR ROUND 2MM CARBIDE LONG 5092-224

## (undated) DEVICE — SU VICRYL 4-0 RB-1 27" J304

## (undated) DEVICE — BLADE SAW MICRO SAGITTAL LINVATEC 9.5X41X0.4MM 5023-140

## (undated) DEVICE — PACK HEAD NECK SEN15HNFSF

## (undated) DEVICE — SYR BULB IRRIG 50ML LATEX FREE 0035280

## (undated) RX ORDER — VECURONIUM BROMIDE 1 MG/ML
INJECTION, POWDER, LYOPHILIZED, FOR SOLUTION INTRAVENOUS
Status: DISPENSED
Start: 2019-02-28

## (undated) RX ORDER — CHLORHEXIDINE GLUCONATE ORAL RINSE 1.2 MG/ML
SOLUTION DENTAL
Status: DISPENSED
Start: 2019-02-28

## (undated) RX ORDER — FENTANYL CITRATE 50 UG/ML
INJECTION, SOLUTION INTRAMUSCULAR; INTRAVENOUS
Status: DISPENSED
Start: 2019-02-28

## (undated) RX ORDER — LIDOCAINE HYDROCHLORIDE AND EPINEPHRINE BITARTRATE 20; .01 MG/ML; MG/ML
INJECTION, SOLUTION SUBCUTANEOUS
Status: DISPENSED
Start: 2019-02-28

## (undated) RX ORDER — OXYMETAZOLINE HYDROCHLORIDE 0.05 G/100ML
SPRAY NASAL
Status: DISPENSED
Start: 2019-02-28

## (undated) RX ORDER — HYDROMORPHONE HYDROCHLORIDE 1 MG/ML
INJECTION, SOLUTION INTRAMUSCULAR; INTRAVENOUS; SUBCUTANEOUS
Status: DISPENSED
Start: 2019-02-28

## (undated) RX ORDER — BENZOCAINE/MENTHOL 6 MG-10 MG
LOZENGE MUCOUS MEMBRANE
Status: DISPENSED
Start: 2019-02-28

## (undated) RX ORDER — CEFAZOLIN SODIUM 1 G/3ML
INJECTION, POWDER, FOR SOLUTION INTRAMUSCULAR; INTRAVENOUS
Status: DISPENSED
Start: 2019-02-28

## (undated) RX ORDER — DEXAMETHASONE SODIUM PHOSPHATE 4 MG/ML
INJECTION, SOLUTION INTRA-ARTICULAR; INTRALESIONAL; INTRAMUSCULAR; INTRAVENOUS; SOFT TISSUE
Status: DISPENSED
Start: 2019-02-28

## (undated) RX ORDER — PROPOFOL 10 MG/ML
INJECTION, EMULSION INTRAVENOUS
Status: DISPENSED
Start: 2019-02-28

## (undated) RX ORDER — ONDANSETRON 2 MG/ML
INJECTION INTRAMUSCULAR; INTRAVENOUS
Status: DISPENSED
Start: 2019-02-28

## (undated) RX ORDER — LIDOCAINE HYDROCHLORIDE 20 MG/ML
INJECTION, SOLUTION EPIDURAL; INFILTRATION; INTRACAUDAL; PERINEURAL
Status: DISPENSED
Start: 2019-02-28

## (undated) RX ORDER — METHYLPREDNISOLONE SODIUM SUCCINATE 125 MG/2ML
INJECTION, POWDER, LYOPHILIZED, FOR SOLUTION INTRAMUSCULAR; INTRAVENOUS
Status: DISPENSED
Start: 2019-02-28

## (undated) RX ORDER — GLYCOPYRROLATE 0.2 MG/ML
INJECTION, SOLUTION INTRAMUSCULAR; INTRAVENOUS
Status: DISPENSED
Start: 2019-02-28

## (undated) RX ORDER — CEFAZOLIN SODIUM 2 G/100ML
INJECTION, SOLUTION INTRAVENOUS
Status: DISPENSED
Start: 2019-02-28